# Patient Record
Sex: MALE | Race: WHITE | NOT HISPANIC OR LATINO | Employment: OTHER | URBAN - METROPOLITAN AREA
[De-identification: names, ages, dates, MRNs, and addresses within clinical notes are randomized per-mention and may not be internally consistent; named-entity substitution may affect disease eponyms.]

---

## 2017-01-12 ENCOUNTER — MYC MEDICAL ADVICE (OUTPATIENT)
Dept: FAMILY MEDICINE | Facility: CLINIC | Age: 62
End: 2017-01-12

## 2017-01-12 DIAGNOSIS — R97.20 ELEVATED PROSTATE SPECIFIC ANTIGEN (PSA): Primary | ICD-10-CM

## 2017-01-12 NOTE — TELEPHONE ENCOUNTER
From: Alejo Antunez  To: Americo Brown MD  Sent: 1/12/2017 8:35 AM CST  Subject: PSA test    Dr. Brown,    Three months ago I got PSA results which were 3.6 and a year earlier it was 2.2. You indicated having another PSA test in three months. I have an appointment with you next Wednesday. I have two questions. Is it necessary to have an office visit to get this blood test? Does this blood test require fasting?    Congratulations on your fantasy football championship.    Alejo Antunez

## 2017-01-19 DIAGNOSIS — R97.20 ELEVATED PROSTATE SPECIFIC ANTIGEN (PSA): ICD-10-CM

## 2017-01-19 PROCEDURE — 84153 ASSAY OF PSA TOTAL: CPT | Mod: 90 | Performed by: FAMILY MEDICINE

## 2017-01-19 PROCEDURE — 36415 COLL VENOUS BLD VENIPUNCTURE: CPT | Performed by: FAMILY MEDICINE

## 2017-01-20 LAB — ABBOTT PSA - QUEST: 4.2 NG/ML

## 2017-01-26 ENCOUNTER — TRANSFERRED RECORDS (OUTPATIENT)
Dept: FAMILY MEDICINE | Facility: CLINIC | Age: 62
End: 2017-01-26

## 2017-01-27 ENCOUNTER — MYC MEDICAL ADVICE (OUTPATIENT)
Dept: FAMILY MEDICINE | Facility: CLINIC | Age: 62
End: 2017-01-27

## 2017-01-27 DIAGNOSIS — F40.298 FEAR OF TESTS: Primary | ICD-10-CM

## 2017-01-30 RX ORDER — ALPRAZOLAM 0.5 MG
0.5 TABLET ORAL DAILY PRN
Qty: 2 TABLET | Refills: 0 | Status: SHIPPED | OUTPATIENT
Start: 2017-01-30 | End: 2017-03-29

## 2017-01-30 NOTE — TELEPHONE ENCOUNTER
From: Alejo Antunez  To: Americo Brown MD  Sent: 1/27/2017 1:50 PM CST  Subject: RX    Dr. Brown,    Dr. Nobles is planning to send you an email that I am going to get a prostate biopsy in a few weeks. Is it possible to get a RX for one Xanax pill prior to the biopsy? If not I understand. The Urologist office said that taking one an hour before the biopsy would be fine.    Alejo Antunez

## 2017-02-16 ENCOUNTER — TELEPHONE (OUTPATIENT)
Dept: FAMILY MEDICINE | Facility: CLINIC | Age: 62
End: 2017-02-16

## 2017-02-23 ENCOUNTER — TRANSFERRED RECORDS (OUTPATIENT)
Dept: FAMILY MEDICINE | Facility: CLINIC | Age: 62
End: 2017-02-23

## 2017-03-29 ENCOUNTER — OFFICE VISIT (OUTPATIENT)
Dept: FAMILY MEDICINE | Facility: CLINIC | Age: 62
End: 2017-03-29

## 2017-03-29 VITALS
RESPIRATION RATE: 20 BRPM | TEMPERATURE: 98 F | BODY MASS INDEX: 30.89 KG/M2 | DIASTOLIC BLOOD PRESSURE: 68 MMHG | SYSTOLIC BLOOD PRESSURE: 110 MMHG | WEIGHT: 196.8 LBS | HEART RATE: 64 BPM | HEIGHT: 67 IN

## 2017-03-29 DIAGNOSIS — Z01.818 PRE-OPERATIVE EXAMINATION: Primary | ICD-10-CM

## 2017-03-29 LAB
ERYTHROCYTE [DISTWIDTH] IN BLOOD BY AUTOMATED COUNT: 11.5 %
HCT VFR BLD AUTO: 48.8 % (ref 40–53)
HEMOGLOBIN: 16.2 G/DL (ref 13.3–17.7)
MCH RBC QN AUTO: 31.2 PG (ref 26–33)
MCHC RBC AUTO-ENTMCNC: 33.2 G/DL (ref 31–36)
MCV RBC AUTO: 93.9 FL (ref 78–100)
PLATELET COUNT - QUEST: 254 10^9/L (ref 150–375)
RBC # BLD AUTO: 5.2 10*12/L (ref 4.4–5.9)
WBC # BLD AUTO: 6.7 10*9/L (ref 4–11)

## 2017-03-29 PROCEDURE — 99214 OFFICE O/P EST MOD 30 MIN: CPT | Performed by: PHYSICIAN ASSISTANT

## 2017-03-29 PROCEDURE — 36415 COLL VENOUS BLD VENIPUNCTURE: CPT | Performed by: PHYSICIAN ASSISTANT

## 2017-03-29 PROCEDURE — 85027 COMPLETE CBC AUTOMATED: CPT | Performed by: PHYSICIAN ASSISTANT

## 2017-03-29 NOTE — LETTER
[facility]  [address]  [address]  [facility] [address]  [phone]  Dept: [phone]     PRE-OP EVALUATION:  Today's date: 3/29/2017     Alejo Antunez (: 1955) presents for pre-operative evaluation assessment as requested by Dr. Riggins. He requires evaluation and anesthesia risk assessment prior to undergoing surgery/procedure for treatment of prostate . Proposed procedure: prostate biopsy     Date of Surgery/ Procedure: 17  Time of Surgery/ Procedure: 9am  Hospital/Surgical Facility: [facility]  Fax number for surgical facility:   Primary Physician: Americo Brown  Type of Anesthesia Anticipated: to be determined     Patient has a Health Care Directive or Living Will: YES      1. NO - Do you have a history of heart attack, stroke, stent, bypass or surgery on an artery in the head, neck, heart or legs?  2. NO - Do you ever have any pain or discomfort in your chest?  3. NO - Do you have a history of Heart Failure?  4. NO - Are you troubled by shortness of breath when: walking on the level, up a slight hill or at night?  5. NO - Do you currently have a cold, bronchitis or other respiratory infection?  6. NO - Do you have a cough, shortness of breath or wheezing?  7. NO - Do you sometimes get pains in the calves of your legs when you walk?  8. NO - Do you or anyone in your family have previous history of blood clots?  9. NO - Do you or does anyone in your family have a serious bleeding problem such as prolonged bleeding following surgeries or cuts?  10. NO - Have you ever had problems with anemia or been told to take iron pills?  11. NO - Have you had any abnormal blood loss such as black, tarry or bloody stools, or abnormal vaginal bleeding?  12. NO - Have you ever had a blood transfusion?  13. NO - Have you or any of your relatives ever had problems with anesthesia?  14. NO - Do you have sleep apnea, excessive snoring or daytime  drowsiness?  15. NO - Do you have any prosthetic heart valves?  16. NO - Do you have prosthetic joints?  17. NO - Is there any chance that you may be pregnant?        HPI:       Brief HPI related to upcoming procedure: Alejo first has slightly elevated 10/12/2016 at 3.6. Checked again in 1/2017 and had increased to 4.2. Saw Dr. Riggins, urologist, who given family history of prostate cancer in his father (age 77), recommended biopsy. He gave Alejo the option for local or systemic sedation, and Alejo elected to have systemic sedation. Has had some symptoms with dysuria, decreased stream. Had been on Flomax in past, but no longer taking.     See problem list for active medical problems. Problems all longstanding and stable, except as noted/documented. See ROS for pertinent symptoms related to these conditions. .     MEDICAL HISTORY:               Patient Active Problem List     Diagnosis Date Noted     Other abnormal glucose 10/11/2016       Priority: Medium     Personal history of tobacco use, presenting hazards to health 10/11/2016       Priority: Medium     Psoriasis annularis 10/11/2016       Priority: Medium       Dr Mcgill     ACP (advance care planning) 01/12/2016       Priority: Medium       Advance Care Planning 3/18/2016: Receipt of ACP document: Received: Health Care Directive which was witnessed or notarized on 2-15-16. Document previously scanned on 2-17-16. Validation form completed and sent to be scanned. Code Status needs to be updated to reflect choices in most recent ACP document. Confirmed/documented designated decision maker(s). Added by Maribeth Easley RN Advance Care Planning Liaison with Philip Holliday  Advance Care Planning 1/12/2016: ACP Review of Chart / Resources Provided: Reviewed chart for advance care plan. Alejo Antunez has no plan or code status on file. Discussed available resources and provided with information. Confirmed code status reflects current choices pending further ACP  "discussions. Confirmed/documented legally designated decision maker(s). 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         OTC products:  Took ibuprofen 3 days ago.           Allergies   Allergen Reactions     Sulfa Drugs         Eye drops-redness      Latex Allergy: NO             Social History   Substance Use Topics     Smoking status: Former Smoker       Packs/day: 1.00       Years: 35.00       Types: Cigarettes     Smokeless tobacco: Never Used     Alcohol use 6.0 oz/week        10 Standard drinks or equivalent per week          Comment: 7-8/wk          History   Drug Use No         REVIEW OF SYSTEMS:      Constitutional, neuro, ENT, endocrine, pulmonary, cardiac, gastrointestinal, genitourinary, musculoskeletal, integument and psychiatric systems are negative, except as otherwise noted.     EXAM:      /68 (BP Location: Left arm, Patient Position: Chair, Cuff Size: Adult Regular)  Pulse 64  Temp 98  F (36.7  C) (Oral)  Resp 20  Ht 1.708 m (5' 7.25\")  Wt 89.3 kg (196 lb 12.8 oz)  BMI 30.59 kg/m2  GENERAL APPEARANCE: healthy, alert and no distress  EYES: EOMI, PERRL  HENT: ear canals and TM's normal and nose and mouth " without ulcers or lesions  NECK: no adenopathy, no asymmetry, masses, or scars and thyroid normal to palpation  RESP: lungs clear to auscultation - no rales, rhonchi or wheezes  CV: regular rates and rhythm, normal S1 S2, no S3 or S4 and no murmur, click or rub  ABDOMEN: soft, nontender, no HSM or masses and bowel sounds normal  MS: extremities normal- no gross deformities noted, no evidence of inflammation in joints, FROM in all extremities.  SKIN: no suspicious lesions or rashes  NEURO: Normal strength and tone, sensory exam grossly normal, mentation intact and speech normal  PSYCH: mentation appears normal. and affect normal/bright  LYMPHATICS: No axillary, cervical, or supraclavicular nodes     DIAGNOSTICS:      Hemoglobin (indicated for history of anemia or procedure with significant blood loss such as tonsillectomy, major intraperitoneal surgery, vascular surgery, major spine surgery, total joint replacement)             Office Visit on 03/29/2017   Component Date Value Ref Range Status     WBC 03/29/2017 6.7  4.0 - 11 10*9/L Final     RBC Count 03/29/2017 5.20  4.4 - 5.9 10*12/L Final     Hemoglobin 03/29/2017 16.2  13.3 - 17.7 g/dL Final     Hematocrit 03/29/2017 48.8  40.0 - 53.0 % Final     MCV 03/29/2017 93.9  78 - 100 fL Final     MCH 03/29/2017 31.2  26 - 33 pg Final     MCHC 03/29/2017 33.2  31 - 36 g/dL Final     RDW 03/29/2017 11.5  % Final     Platelet Count 03/29/2017 254  150 - 375 10^9/L Final         IMPRESSION:      Reason for surgery/procedure: Elevated PSA, family history of prostate.   Diagnosis/reason for consult: Pre-operative examination.   The proposed surgical procedure is considered INTERMEDIATE risk.     REVISED CARDIAC RISK INDEX  The patient has the following serious cardiovascular risks for perioperative complications such as (MI, PE, VFib and 3  AV Block):  No serious cardiac risks  INTERPRETATION: 0 risks: Class I (very low risk - 0.4% complication rate)     The patient has the  following additional risks for perioperative complications:  No identified additional risks         ICD-10-CM     1. Pre-operative examination Z01.818 HEMOGRAM/PLATELET (BFP)         RECOMMENDATIONS:         Patient does not take any medications. He will be taking 1 tablet of antibiotic (unsure of which one) on the morning of surgery per urology team.     APPROVAL GIVEN to proceed with proposed procedure, without further diagnostic evaluation      1. Seven days before surgery do not take Aspirin or any over-the-counter pain medications other than Tylenol. TYLENOL is the safest pain pill to use before surgery because it does not affect your bleeding time. If tylenol is not sufficient for pain control talk to me or the surgeon and we will decide what is safe to use.     2. Do not eat anything after midnight (naeem of the surgery) and nothing the morning of the surgery.     3. Medications: Not taking any medications.     4. Follow all instructions given by the surgery team. They usually give out a packet. Read it and please follow it precisely. This helps surgical experience and outcomes.     5. If you have any questions do not hesitate to call me or the surgeon/surgical team.        Jody Saenz PA-C  [facility]           Signed Electronically by: Jody Saenz PA-C     Copy of this evaluation report is provided to requesting physician.

## 2017-03-29 NOTE — NURSING NOTE
Alejo Antunez is here for a pre-op exam.  Questioned patient about current smoking habits.  Pt. recently quit smoking.  Body mass index is 25.89 kg/(m^2).  PULSE regular  My Chart: active  CLASSIFICATION OF OVERWEIGHT AND OBESITY BY BMI                        Obesity Class           BMI(kg/m2)  Underweight                                    < 18.5  Normal                                         18.5-24.9  Overweight                                     25.0-29.9  OBESITY                     I                  30.0-34.9                             II                 35.0-39.9  EXTREME OBESITY             III                >40                            Patient's  BMI Body mass index is 30.59 kg/(m^2).  http://hin.nhlbi.nih.gov/menuplanner/menu.cgi    Pre-visit planning  Immunizations - up to date  Colonoscopy - is up to date  Mammogram -   Asthma -   PHQ9 -    CHAPO-7 -

## 2017-03-29 NOTE — PROGRESS NOTES
[facility]  [address]  [address]  [facility] [address]  [phone]  Dept: [phone]    PRE-OP EVALUATION:  Today's date: 3/29/2017    Alejo Antunez (: 1955) presents for pre-operative evaluation assessment as requested by Dr. Riggins.  He requires evaluation and anesthesia risk assessment prior to undergoing surgery/procedure for treatment of prostate .  Proposed procedure: prostate biopsy    Date of Surgery/ Procedure: 17  Time of Surgery/ Procedure: 9am  Hospital/Surgical Facility: [facility]  Fax number for surgical facility:   Primary Physician: Americo Brown  Type of Anesthesia Anticipated: to be determined    Patient has a Health Care Directive or Living Will:  YES     1. NO - Do you have a history of heart attack, stroke, stent, bypass or surgery on an artery in the head, neck, heart or legs?  2. NO - Do you ever have any pain or discomfort in your chest?  3. NO - Do you have a history of  Heart Failure?  4. NO - Are you troubled by shortness of breath when: walking on the level, up a slight hill or at night?  5. NO - Do you currently have a cold, bronchitis or other respiratory infection?  6. NO - Do you have a cough, shortness of breath or wheezing?  7. NO - Do you sometimes get pains in the calves of your legs when you walk?  8. NO - Do you or anyone in your family have previous history of blood clots?  9. NO - Do you or does anyone in your family have a serious bleeding problem such as prolonged bleeding following surgeries or cuts?  10. NO - Have you ever had problems with anemia or been told to take iron pills?  11. NO - Have you had any abnormal blood loss such as black, tarry or bloody stools, or abnormal vaginal bleeding?  12. NO - Have you ever had a blood transfusion?  13. NO - Have you or any of your relatives ever had problems with anesthesia?  14. NO - Do you have sleep apnea, excessive snoring or daytime  drowsiness?  15. NO - Do you have any prosthetic heart valves?  16. NO - Do you have prosthetic joints?  17. NO - Is there any chance that you may be pregnant?      HPI:                                                      Brief HPI related to upcoming procedure: Alejo first has slightly elevated 10/12/2016 at 3.6. Checked again in 1/2017 and had increased to 4.2. Saw Dr. Riggins, urologist, who given family history of prostate cancer in his father (age 77), recommended biopsy. He gave Alejo the option for local or systemic sedation, and Alejo elected to have systemic sedation. Has had some symptoms with dysuria, decreased stream. Had been on Flomax in past, but no longer taking.    See problem list for active medical problems.  Problems all longstanding and stable, except as noted/documented.  See ROS for pertinent symptoms related to these conditions.                                                                                                 .    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  Document previously scanned on 2-17-16.  Validation form completed and sent to be scanned.  Code Status needs to be updated to reflect choices in most recent ACP document. Confirmed/documented designated decision maker(s).  Added by Maribeth Easley RN Advance Care Planning Liaison with Philip Holliday  Advance Care Planning 1/12/2016: ACP Review of Chart / Resources Provided:  Reviewed chart for advance care plan.  Alejo DAVEY  "Tulio has no plan or code status on file. Discussed available resources and provided with information. Confirmed code status reflects current choices pending further ACP discussions.  Confirmed/documented legally designated decision maker(s). 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     OTC products:  Took ibuprofen 3 days ago.    Allergies   Allergen Reactions     Sulfa Drugs      Eye drops-redness      Latex Allergy: NO    Social History   Substance Use Topics     Smoking status: Former Smoker     Packs/day: 1.00     Years: 35.00     Types: Cigarettes     Smokeless tobacco: Never Used     Alcohol use 6.0 oz/week     10 Standard drinks or equivalent per week      Comment: 7-8/wk     History   Drug Use No       REVIEW OF SYSTEMS:                                                    Constitutional, neuro, ENT, endocrine, pulmonary, cardiac, gastrointestinal, genitourinary, musculoskeletal, integument and psychiatric systems are negative, except as otherwise noted.    EXAM:                                                    /68 (BP Location: Left arm, Patient Position: Chair, Cuff Size: Adult Regular)  Pulse 64  Temp 98  F (36.7  C) (Oral)  Resp 20  Ht 1.708 m (5' 7.25\")  Wt 89.3 kg (196 lb 12.8 oz)  BMI 30.59 kg/m2    GENERAL APPEARANCE: " healthy, alert and no distress     EYES: EOMI,  PERRL     HENT: ear canals and TM's normal and nose and mouth without ulcers or lesions     NECK: no adenopathy, no asymmetry, masses, or scars and thyroid normal to palpation     RESP: lungs clear to auscultation - no rales, rhonchi or wheezes     CV: regular rates and rhythm, normal S1 S2, no S3 or S4 and no murmur, click or rub     ABDOMEN:  soft, nontender, no HSM or masses and bowel sounds normal     MS: extremities normal- no gross deformities noted, no evidence of inflammation in joints, FROM in all extremities.     SKIN: no suspicious lesions or rashes     NEURO: Normal strength and tone, sensory exam grossly normal, mentation intact and speech normal     PSYCH: mentation appears normal. and affect normal/bright     LYMPHATICS: No axillary, cervical, or supraclavicular nodes    DIAGNOSTICS:                                                    Hemoglobin (indicated for history of anemia or procedure with significant blood loss such as tonsillectomy, major intraperitoneal surgery, vascular surgery, major spine surgery, total joint replacement)    Office Visit on 03/29/2017   Component Date Value Ref Range Status     WBC 03/29/2017 6.7  4.0 - 11 10*9/L Final     RBC Count 03/29/2017 5.20  4.4 - 5.9 10*12/L Final     Hemoglobin 03/29/2017 16.2  13.3 - 17.7 g/dL Final     Hematocrit 03/29/2017 48.8  40.0 - 53.0 % Final     MCV 03/29/2017 93.9  78 - 100 fL Final     MCH 03/29/2017 31.2  26 - 33 pg Final     MCHC 03/29/2017 33.2  31 - 36 g/dL Final     RDW 03/29/2017 11.5  % Final     Platelet Count 03/29/2017 254  150 - 375 10^9/L Final       IMPRESSION:                                                    Reason for surgery/procedure: Elevated PSA, family history of prostate.   Diagnosis/reason for consult: Pre-operative examination.    The proposed surgical procedure is considered INTERMEDIATE risk.    REVISED CARDIAC RISK INDEX  The patient has the following serious  cardiovascular risks for perioperative complications such as (MI, PE, VFib and 3  AV Block):  No serious cardiac risks  INTERPRETATION: 0 risks: Class I (very low risk - 0.4% complication rate)    The patient has the following additional risks for perioperative complications:  No identified additional risks      ICD-10-CM    1. Pre-operative examination Z01.818 HEMOGRAM/PLATELET (BFP)       RECOMMENDATIONS:                                                      Patient does not take any medications. He will be taking 1 tablet of antibiotic (unsure of which one) on the morning of surgery per urology team.    APPROVAL GIVEN to proceed with proposed procedure, without further diagnostic evaluation     1. Seven days before surgery do not take Aspirin or any over-the-counter pain medications other than Tylenol.  TYLENOL is the safest pain pill to use before surgery because it does not affect your bleeding time. If tylenol is not sufficient for pain control talk to me or the surgeon and we will decide what is safe to use.    2. Do not eat anything after midnight  (naeem of the surgery) and nothing the morning of the surgery.    3. Medications: Not taking any medications.    4. Follow all instructions given by the surgery team. They usually give out a packet. Read it and please follow it precisely. This helps surgical experience and outcomes.    5. If you have any questions do not hesitate to call me or the surgeon/surgical team.      Jody Saenz PA-C  [facility]        Signed Electronically by: Jody Saenz PA-C    Copy of this evaluation report is provided to requesting physician.    [facility] Preop Guidelines

## 2017-03-29 NOTE — MR AVS SNAPSHOT
"              After Visit Summary   3/29/2017    Alejo Antunez    MRN: [id number]           Patient Information     Date Of Birth          1955        Visit Information        Provider Department      3/29/2017 9:00 AM Jody Saenz PA-C [facility]        Today's Diagnoses     Pre-operative examination    -  1       Follow-ups after your visit        Who to contact     If you have questions or need follow up information about today's clinic visit or your schedule please contact [facility] directly at [phone].  Normal or non-critical lab and imaging results will be communicated to you by [employer], letter or phone within 4 business days after the clinic has received the results. If you do not hear from us within 7 days, please contact the clinic through [employer] or phone. If you have a critical or abnormal lab result, we will notify you by phone as soon as possible.  Submit refill requests through [employer] or call your pharmacy and they will forward the refill request to us. Please allow 3 business days for your refill to be completed.          Additional Information About Your Visit        MyChart Information     [employer] gives you secure access to your electronic health record. If you see a primary care provider, you can also send messages to your care team and make appointments. If you have questions, please call your primary care clinic.  If you do not have a primary care provider, please call [phone] and they will assist you.        Care EveryWhere ID     This is your Care EveryWhere ID. This could be used by other organizations to access your [facility] medical records  [id number]        Your Vitals Were     Pulse Temperature Respirations Height BMI (Body Mass Index)       64 98  F (36.7  C) (Oral) 20 1.708 m (5' 7.25\") 30.59 kg/m2        Blood Pressure from Last 3 Encounters:   03/29/17 110/68   10/11/16 122/76   08/25/16 110/70    Weight from " Last 3 Encounters:   03/29/17 89.3 kg (196 lb 12.8 oz)   10/11/16 84 kg (185 lb 3.2 oz)   08/25/16 83.9 kg (185 lb)              We Performed the Following     HEMOGRAM/PLATELET (BFP)        Primary Care Provider Office Phone # Fax #    Americo David Brown [phone]       [facility] [address] [facility] [address]  [facility] [address]        Thank you!     Thank you for choosing [facility]  for your care. Our goal is always to provide you with excellent care. Hearing back from our patients is one way we can continue to improve our services. Please take a few minutes to complete the written survey that you may receive in the mail after your visit with us. Thank you!             Your Updated Medication List - Protect others around you: Learn how to safely use, store and throw away your medicines at www.disposemymeds.org.      Notice  As of 3/29/2017  9:34 AM    You have not been prescribed any medications.

## 2017-05-31 ENCOUNTER — OFFICE VISIT (OUTPATIENT)
Dept: FAMILY MEDICINE | Facility: CLINIC | Age: 62
End: 2017-05-31

## 2017-05-31 VITALS
SYSTOLIC BLOOD PRESSURE: 108 MMHG | HEART RATE: 88 BPM | RESPIRATION RATE: 20 BRPM | TEMPERATURE: 98.7 F | WEIGHT: 195.6 LBS | BODY MASS INDEX: 30.7 KG/M2 | DIASTOLIC BLOOD PRESSURE: 68 MMHG | HEIGHT: 67 IN
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
## 2017-05-31 PROCEDURE — 99213 OFFICE O/P EST LOW 20 MIN: CPT | Performed by: PHYSICIAN ASSISTANT

## 2017-05-31 RX ORDER — DOXYCYCLINE HYCLATE 100 MG
100 TABLET ORAL 2 TIMES DAILY
Qty: 28 TABLET | Refills: 0 | Status: SHIPPED | OUTPATIENT
Start: 2017-05-31 | End: 2017-11-16

## 2017-05-31 RX ORDER — DOXYCYCLINE HYCLATE 100 MG
100 TABLET ORAL 2 TIMES DAILY
Qty: 28 TABLET | Refills: 0 | Status: SHIPPED | OUTPATIENT
Start: 2017-05-31 | End: 2017-05-31

## 2017-05-31 NOTE — NURSING NOTE
Alejo Antunez is here for a possible infected tick bite.    Questioned patient about current smoking habits.  Pt. quit smoking some time ago.  Body mass index is 25.89 kg/(m^2).  PULSE regular  My Chart: active  CLASSIFICATION OF OVERWEIGHT AND OBESITY BY BMI                        Obesity Class           BMI(kg/m2)  Underweight                                    < 18.5  Normal                                         18.5-24.9  Overweight                                     25.0-29.9  OBESITY                     I                  30.0-34.9                             II                 35.0-39.9  EXTREME OBESITY             III                >40                            Patient's  BMI Body mass index is 30.64 kg/(m^2).  http://hin.nhlbi.nih.gov/menuplanner/menu.cgi    Pre-visit planning  Immunizations - up to date  Colonoscopy - is up to date  Mammogram -   Asthma -   PHQ9 -    CHAPO-7 -

## 2017-05-31 NOTE — MR AVS SNAPSHOT
"              After Visit Summary   5/31/2017    Alejo Antunez    MRN: [id number]           Patient Information     Date Of Birth          1955        Visit Information        Provider Department      5/31/2017 1:30 PM Jody Saenz PA-C [facility]        Today's Diagnoses     Tick bite, initial encounter    -  1       Follow-ups after your visit        Follow-up notes from your care team     Return if symptoms worsen or fail to improve.      Who to contact     If you have questions or need follow up information about today's clinic visit or your schedule please contact [facility] directly at [phone].  Normal or non-critical lab and imaging results will be communicated to you by Process Relationshart, letter or phone within 4 business days after the clinic has received the results. If you do not hear from us within 7 days, please contact the clinic through Process Relationshart or phone. If you have a critical or abnormal lab result, we will notify you by phone as soon as possible.  Submit refill requests through YODIL or call your pharmacy and they will forward the refill request to us. Please allow 3 business days for your refill to be completed.          Additional Information About Your Visit        MyChart Information     YODIL gives you secure access to your electronic health record. If you see a primary care provider, you can also send messages to your care team and make appointments. If you have questions, please call your primary care clinic.  If you do not have a primary care provider, please call [phone] and they will assist you.        Care EveryWhere ID     This is your Care EveryWhere ID. This could be used by other organizations to access your [place] medical records  [id number]        Your Vitals Were     Pulse Temperature Respirations Height BMI (Body Mass Index)       88 98.7  F (37.1  C) (Oral) 20 1.702 m (5' 7\") 30.64 kg/m2        Blood Pressure " from Last 3 Encounters:   05/31/17 108/68   03/29/17 110/68   10/11/16 122/76    Weight from Last 3 Encounters:   05/31/17 88.7 kg (195 lb 9.6 oz)   03/29/17 89.3 kg (196 lb 12.8 oz)   10/11/16 84 kg (185 lb 3.2 oz)              Today, you had the following     No orders found for display         Today's Medication Changes          These changes are accurate as of: 5/31/17  4:18 PM.  If you have any questions, ask your nurse or doctor.               Start taking these medicines.        Dose/Directions    doxycycline 100 MG tablet   Commonly known as:  VIBRA-TABS   Used for:  Tick bite, initial encounter   Started by:  Jody Saenz PA-C        Dose:  100 mg   Take 1 tablet (100 mg) by mouth 2 times daily   Quantity:  28 tablet   Refills:  0            Where to get your medicines      These medications were sent to [facility] [address] [place] [address] AT [place] [address] & [facility]  [address] [address] [facility] [address]     Phone:  [phone]     doxycycline 100 MG tablet                Primary Care Provider Office Phone # Fax #    Americo Brown [phone]       [facility] [address] [facility] [address]  [facility] [address]        Thank you!     Thank you for choosing [facility]  for your care. Our goal is always to provide you with excellent care. Hearing back from our patients is one way we can continue to improve our services. Please take a few minutes to complete the written survey that you may receive in the mail after your visit with us. Thank you!             Your Updated Medication List - Protect others around you: Learn how to safely use, store and throw away your medicines at www.disposemymeds.org.          This list is accurate as of: 5/31/17  4:18 PM.  Always use your most recent med list.                   Brand Name Dispense Instructions for use    doxycycline 100 MG tablet    VIBRA-TABS     28 tablet    Take 1 tablet (100 mg) by mouth 2 times daily

## 2017-05-31 NOTE — PROGRESS NOTES
"CC: Tick bite    History:  Alejo is here concerned about a reaction to a tick bite. He identified it as a wood tick. He found the tick on Monday night, and does not think it was there for any more than 24 hours. Has not noticed any drainage from the site, but there is some redness, swelling and itching. No fevers, sweats, chills, muscle/joint aches, neck pain or stiffness. Tried applying steroid cream that he has for his eczema which helped temporarily. Has not taken any allergy medication.    Alejo [facility] when he got this tick, and plans to go back up there tomorrow or Friday.     PMH, MEDICATIONS, ALLERGIES, SOCIAL AND FAMILY HISTORY in [facility] and reviewed by me personally.      ROS negative other than the symptoms noted above in the HPI.        Examination   /68 (BP Location: Right arm, Patient Position: Chair, Cuff Size: Adult Regular)  Pulse 88  Temp 98.7  F (37.1  C) (Oral)  Resp 20  Ht 1.702 m (5' 7\")  Wt 88.7 kg (195 lb 9.6 oz)  BMI 30.64 kg/m2       Constitutional: Sitting comfortably, in no acute distress. Vital signs noted  Cardiovascular:  regular rate and rhythm, no murmurs, clicks, or gallops  Respiratory:  normal respiratory rate and rhythm, lungs clear to auscultation  Abdomen: Abdomen soft, non-tender.  SKIN: No jaundice/pallor/rash.   Psychiatric: mentation appears normal and affect normal/bright        A/P    ICD-10-CM    1. Tick bite, initial encounter W57.XXXA doxycycline (VIBRA-TABS) 100 MG tablet     DISCONTINUED: doxycycline (VIBRA-TABS) 100 MG tablet    possible infection, unlikely [place]       DISCUSSION: I believe Alejo's symptoms are most consistent with an allergic reaction to the tick bite. Recommended:  Apply the steroid cream (likely triamcinolone) 2-3 times/day for 1-2 weeks. Watch for skin changes, and discontinue if noticed  Consider taking Allegra, Zyrtec, or Claritin 1 24 hour tablet daily to help with itching. Can also take Benedryl at night- it " will likely make you drowsy.   Watch for signs of spreading redness, pus drainage, bulls-eye shape over the next 2-3 weeks. If you notice this symptoms, start doxycycline with food, and contact us immediately.    follow up visit: As needed    Jody Saenz PA-C  [facility]

## 2017-09-19 DIAGNOSIS — R97.20 ELEVATED PROSTATE SPECIFIC ANTIGEN (PSA): Primary | ICD-10-CM

## 2017-09-19 PROCEDURE — 84153 ASSAY OF PSA TOTAL: CPT | Mod: 90 | Performed by: FAMILY MEDICINE

## 2017-09-19 PROCEDURE — 36415 COLL VENOUS BLD VENIPUNCTURE: CPT | Performed by: FAMILY MEDICINE

## 2017-09-19 NOTE — NURSING NOTE
Pt here for lab only blood draw, has orders from [facility] urology.  Will fax labs when resulted.   [phone]

## 2017-09-20 LAB — ABBOTT PSA - QUEST: 4.5 NG/ML

## 2017-10-09 ENCOUNTER — TRANSFERRED RECORDS (OUTPATIENT)
Dept: FAMILY MEDICINE | Facility: CLINIC | Age: 62
End: 2017-10-09

## 2017-10-19 ENCOUNTER — ALLIED HEALTH/NURSE VISIT (OUTPATIENT)
Dept: FAMILY MEDICINE | Facility: CLINIC | Age: 62
End: 2017-10-19

## 2017-10-19 DIAGNOSIS — Z23 NEED FOR VACCINATION: Primary | ICD-10-CM

## 2017-10-19 PROCEDURE — 90686 IIV4 VACC NO PRSV 0.5 ML IM: CPT | Performed by: FAMILY MEDICINE

## 2017-10-19 PROCEDURE — 90471 IMMUNIZATION ADMIN: CPT | Performed by: FAMILY MEDICINE

## 2017-11-16 ENCOUNTER — OFFICE VISIT (OUTPATIENT)
Dept: FAMILY MEDICINE | Facility: CLINIC | Age: 62
End: 2017-11-16

## 2017-11-16 VITALS
WEIGHT: 187.4 LBS | DIASTOLIC BLOOD PRESSURE: 78 MMHG | SYSTOLIC BLOOD PRESSURE: 112 MMHG | BODY MASS INDEX: 29.41 KG/M2 | TEMPERATURE: 97.8 F | HEIGHT: 67 IN | HEART RATE: 75 BPM | OXYGEN SATURATION: 98 %

## 2017-11-16 DIAGNOSIS — Z80.42 FAMILY HISTORY OF PROSTATE CANCER: ICD-10-CM

## 2017-11-16 DIAGNOSIS — Z00.00 ROUTINE GENERAL MEDICAL EXAMINATION AT A HEALTH CARE FACILITY: Primary | ICD-10-CM

## 2017-11-16 DIAGNOSIS — F40.243 FEAR OF FLYING: ICD-10-CM

## 2017-11-16 PROCEDURE — 80053 COMPREHEN METABOLIC PANEL: CPT | Mod: 90 | Performed by: FAMILY MEDICINE

## 2017-11-16 PROCEDURE — 36415 COLL VENOUS BLD VENIPUNCTURE: CPT | Performed by: FAMILY MEDICINE

## 2017-11-16 PROCEDURE — 80061 LIPID PANEL: CPT | Mod: 90 | Performed by: FAMILY MEDICINE

## 2017-11-16 PROCEDURE — 99396 PREV VISIT EST AGE 40-64: CPT | Performed by: FAMILY MEDICINE

## 2017-11-16 RX ORDER — ALPRAZOLAM 0.5 MG
0.5 TABLET ORAL 3 TIMES DAILY PRN
Qty: 10 TABLET | Refills: 0 | Status: SHIPPED | OUTPATIENT
Start: 2017-11-16 | End: 2019-08-12

## 2017-11-16 RX ORDER — TACROLIMUS 1 MG/G
OINTMENT TOPICAL 2 TIMES DAILY
COMMUNITY
End: 2019-10-22

## 2017-11-16 RX ORDER — FINASTERIDE 5 MG/1
5 TABLET, FILM COATED ORAL DAILY
COMMUNITY
End: 2019-10-22

## 2017-11-16 NOTE — PROGRESS NOTES
SUBJECTIVE:   CC: Alejo Antunez is an 62 year old male who presents for preventative health visit.     Healthy Habits:    Do you get at least three servings of calcium containing foods daily (dairy, green leafy vegetables, etc.)? yes    Amount of exercise or daily activities, outside of work: 5 day(s) per week    Problems taking medications regularly No    Medication side effects: No    Have you had an eye exam in the past two years? yes    Do you see a dentist twice per year? yes    Do you have sleep apnea, excessive snoring or daytime drowsiness?no              Today's PHQ-2 Score: PHQ-2 ( 1999 Pfizer) 11/16/2017 10/11/2016   Q1: Little interest or pleasure in doing things 0 0   Q2: Feeling down, depressed or hopeless 0 0   PHQ-2 Score 0 0   Q1: Little interest or pleasure in doing things - -   Q2: Feeling down, depressed or hopeless - -   PHQ-2 Score - -         Abuse: Current or Past(Physical, Sexual or Emotional)- No  Do you feel safe in your environment - Yes  Social History   Substance Use Topics     Smoking status: Current Every Day Smoker     Packs/day: 1.00     Years: 35.00     Types: Cigarettes     Smokeless tobacco: Never Used     Alcohol use 6.0 oz/week     10 Standard drinks or equivalent per week      Comment: 7-8/wk     The patient does not drink >3 drinks per day nor >7 drinks per week.    Last PSA:   Abbott PSA   Date Value Ref Range Status   09/19/2017 4.5 (H) < OR = 4.0 ng/mL Final     Comment:     The total PSA value from this assay system is   standardized against the WHO standard. The test   result will be approximately 20% lower when compared   to the equimolar-standardized total PSA (Clarence   Flako). Comparison of serial PSA results should be   interpreted with this fact in mind.     This test was performed using the Siemens   chemiluminescent method. Values obtained from   different assay methods cannot be used  interchangeably. PSA levels, regardless of  value, should not be  interpreted as absolute  evidence of the presence or absence of disease.         Reviewed orders with patient. Reviewed health maintenance and updated orders accordingly - Yes  BP Readings from Last 3 Encounters:   11/16/17 112/78   05/31/17 108/68   03/29/17 110/68    Wt Readings from Last 3 Encounters:   11/16/17 85 kg (187 lb 6.4 oz)   05/31/17 88.7 kg (195 lb 9.6 oz)   03/29/17 89.3 kg (196 lb 12.8 oz)                  Patient Active Problem List   Diagnosis     CARDIOVASCULAR SCREENING; LDL GOAL LESS THAN 160     Traumatic cataract of eye     Tobacco use disorder     Family history of prostate cancer     Health Care Home     Fear of flying     ACP (advance care planning)     Other abnormal glucose     Personal history of tobacco use, presenting hazards to health     Psoriasis annularis     Past Surgical History:   Procedure Laterality Date     CATARACT IOL, RT/LT Left      cornea surgery Left      ROTATOR CUFF REPAIR RT/LT Right 2005     TONSILLECTOMY & ADENOIDECTOMY         Social History   Substance Use Topics     Smoking status: Current Every Day Smoker     Packs/day: 1.00     Years: 35.00     Types: Cigarettes     Smokeless tobacco: Never Used     Alcohol use 6.0 oz/week     10 Standard drinks or equivalent per week      Comment: 7-8/wk     Family History   Problem Relation Age of Onset     Eye Disorder Mother      glaucoma     Prostate Cancer Father          Current Outpatient Prescriptions   Medication Sig Dispense Refill     finasteride (PROSCAR) 5 MG tablet Take 5 mg by mouth daily       tacrolimus (PROTOPIC) 0.1 % ointment Apply topically 2 times daily       ALPRAZolam (XANAX) 0.5 MG tablet Take 1 tablet (0.5 mg) by mouth 3 times daily as needed for anxiety 10 tablet 0     Allergies   Allergen Reactions     Sulfa Drugs      Eye drops-redness     Recent Labs   Lab Test  10/11/16   1025  01/19/15   1434  01/23/13   0833   LDL  78  99  104   HDL  84  66  62   TRIG  58  56  83   ALT  11   --    --    CR   "1.04  0.89  1.08   GFRESTIMATED  76  87  70   GFRESTBLACK   --   >90   GFR Calc    85   POTASSIUM  4.0  4.0  4.5              Reviewed and updated as needed this visit by clinical staffTobacco  Allergies  Meds  Problems  Soc Hx        Reviewed and updated as needed this visit by Provider        Past Medical History:   Diagnosis Date     NO ACTIVE PROBLEMS       Past Surgical History:   Procedure Laterality Date     CATARACT IOL, RT/LT Left      cornea surgery Left      ROTATOR CUFF REPAIR RT/LT Right 2005     TONSILLECTOMY & ADENOIDECTOMY         ROS:  C: NEGATIVE for fever, chills, change in weight  I: NEGATIVE for worrisome rashes, moles or lesions  E: NEGATIVE for vision changes or irritation  ENT: NEGATIVE for ear, mouth and throat problems  R: NEGATIVE for significant cough or SOB  CV: NEGATIVE for chest pain, palpitations or peripheral edema  GI: NEGATIVE for nausea, abdominal pain, heartburn, or change in bowel habits   male: negative for dysuria, hematuria, decreased urinary stream, erectile dysfunction, urethral discharge  M: NEGATIVE for significant arthralgias or myalgia  N: NEGATIVE for weakness, dizziness or paresthesias  P: NEGATIVE for changes in mood or affect    OBJECTIVE:   /78 (BP Location: Left arm, Patient Position: Chair, Cuff Size: Adult Large)  Pulse 75  Temp 97.8  F (36.6  C) (Oral)  Ht 1.702 m (5' 7\")  Wt 85 kg (187 lb 6.4 oz)  SpO2 98%  BMI 29.35 kg/m2  EXAM:  GENERAL: healthy, alert and no distress  EYES: Eyes grossly normal to inspection, PERRL and conjunctivae and sclerae normal  HENT: ear canals and TM's normal, nose and mouth without ulcers or lesions  NECK: no adenopathy, no asymmetry, masses, or scars and thyroid normal to palpation  RESP: lungs clear to auscultation - no rales, rhonchi or wheezes  CV: regular rate and rhythm, normal S1 S2, no S3 or S4, no murmur, click or rub, no peripheral edema and peripheral pulses strong  ABDOMEN: soft, " "nontender, no hepatosplenomegaly, no masses and bowel sounds normal   (male): normal male genitalia without lesions or urethral discharge, no hernia  MS: no gross musculoskeletal defects noted, no edema  SKIN: no suspicious lesions or rashes  NEURO: Normal strength and tone, mentation intact and speech normal  PSYCH: mentation appears normal, affect normal/bright    ASSESSMENT/PLAN:   (Z00.00) Routine general medical examination at a health care facility  (primary encounter diagnosis)  Comment: discussed preventitive healthcare   Plan: Lipid Profile ([facility]), COMPREHENSIVE METABOLIC         PANEL ([facility]) XCMP, VENOUS COLLECTION        Continue to work on healthy diet and exercise, discussed healthy habits     (F40.243) Fear of flying  Comment:   Plan: ALPRAZolam (XANAX) 0.5 MG tablet, VENOUS         COLLECTION            (Z80.42) Family history of prostate cancer  Comment: followed by urology-had normal MRI recently  Plan: continue to follow with urology    COUNSELING:  Reviewed preventive health counseling, as reflected in patient instructions       Regular exercise       Healthy diet/nutrition       Colon cancer screening       Prostate cancer screening           reports that he has been smoking Cigarettes.  He has a 35.00 pack-year smoking history. He has never used smokeless tobacco.  Tobacco Cessation Action Plan: Information offered: Patient not interested at this time  Estimated body mass index is 29.35 kg/(m^2) as calculated from the following:    Height as of this encounter: 1.702 m (5' 7\").    Weight as of this encounter: 85 kg (187 lb 6.4 oz).   Weight management plan: Discussed healthy diet and exercise guidelines and patient will follow up in 12 months in clinic to re-evaluate.    Counseling Resources:  ATP IV Guidelines  Pooled Cohorts Equation Calculator  FRAX Risk Assessment  ICSI Preventive Guidelines  Dietary Guidelines for Americans, 2010  USDA's MyPlate  ASA Prophylaxis  Lung CA " Screening    Americo Brown MD  [facility]

## 2017-11-16 NOTE — NURSING NOTE
"Alejo Antunez is here for a CPX. Fasting: Yes (12+ hours).    Pre-visit Planning  Immunizations -up to date  Colonoscopy -is up to date   Mammogram -NA   Asthma test --NA  PHQ2 -is completed today (Score: 0)  CHAPO 7 -NA  Fall Risk Assessment -NA    Vitals:  BP Cuff left  Arm with large Cuff  PULSE regular  187 lbs 6.4 oz and 5' 7\"  CLASSIFICATION OF OVERWEIGHT AND OBESITY BY BMI                        Obesity Class           BMI(kg/m2)  Underweight                                    < 18.5  Normal                                         18.5-24.9  Overweight                                     25.0-29.9  OBESITY                     I                  30.0-34.9                             II                 35.0-39.9  EXTREME OBESITY             III                >40      Patient's  BMI Body mass index is 29.35 kg/(m^2).  Http://hin.nhlbi.nih.gov/menuplanner/menu.cgi  My Chart: - accepts   Tobacco Use:  Questioned patient about current smoking habits.  Pt. Currently smokes.  ETOH screening Questions:  1-How often do you have a drink containing alcohol?                             2 times per week(s)  2-How many drinks containing alcohol do you have on a typical day when you are         Drinking?                             3   3- How often do you have 5 or more drinks on one occasion?                             2 months    Have you ever:  None of the patient's responses to the CAGE screening were positive / Negative CAGE score    Roomed by: Domitila Aguilera CMA      "

## 2017-11-17 LAB
ALBUMIN SERPL-MCNC: 4.5 G/DL (ref 3.6–5.1)
ALBUMIN/GLOB SERPL: 2 (CALC) (ref 1–2.5)
ALP SERPL-CCNC: 57 U/L (ref 40–115)
ALT SERPL-CCNC: 16 U/L (ref 9–46)
AST SERPL-CCNC: 13 U/L (ref 10–35)
BILIRUB SERPL-MCNC: 0.8 MG/DL (ref 0.2–1.2)
BUN SERPL-MCNC: 15 MG/DL (ref 7–25)
BUN/CREATININE RATIO: ABNORMAL (CALC) (ref 6–22)
CALCIUM SERPL-MCNC: 9.4 MG/DL (ref 8.6–10.3)
CHLORIDE SERPLBLD-SCNC: 105 MMOL/L (ref 98–110)
CHOLEST SERPL-MCNC: 199 MG/DL
CHOLEST/HDLC SERPL: 2.5 (CALC)
CO2 SERPL-SCNC: 24 MMOL/L (ref 20–31)
CREAT SERPL-MCNC: 0.99 MG/DL (ref 0.7–1.25)
EGFR AFRICAN AMERICAN - QUEST: 93 ML/MIN/1.73M2
GFR SERPL CREATININE-BSD FRML MDRD: 80 ML/MIN/1.73M2
GLOBULIN, CALCULATED - QUEST: 2.2 G/DL (CALC) (ref 1.9–3.7)
GLUCOSE - QUEST: 109 MG/DL (ref 65–99)
HDLC SERPL-MCNC: 80 MG/DL
LDLC SERPL CALC-MCNC: 104 MG/DL (CALC)
NONHDLC SERPL-MCNC: 119 MG/DL (CALC)
POTASSIUM SERPL-SCNC: 5 MMOL/L (ref 3.5–5.3)
PROT SERPL-MCNC: 6.7 G/DL (ref 6.1–8.1)
SODIUM SERPL-SCNC: 138 MMOL/L (ref 135–146)
TRIGL SERPL-MCNC: 60 MG/DL

## 2017-12-06 ENCOUNTER — OFFICE VISIT (OUTPATIENT)
Dept: FAMILY MEDICINE | Facility: CLINIC | Age: 62
End: 2017-12-06

## 2017-12-06 VITALS
DIASTOLIC BLOOD PRESSURE: 66 MMHG | TEMPERATURE: 98.3 F | RESPIRATION RATE: 20 BRPM | HEART RATE: 68 BPM | SYSTOLIC BLOOD PRESSURE: 102 MMHG

## 2017-12-06 DIAGNOSIS — R20.9 SKIN SENSATION DISTURBANCE: ICD-10-CM
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
## 2017-12-06 DIAGNOSIS — S69.92XA HAND INJURY, LEFT, INITIAL ENCOUNTER: ICD-10-CM

## 2017-12-06 PROCEDURE — 99213 OFFICE O/P EST LOW 20 MIN: CPT | Performed by: PHYSICIAN ASSISTANT

## 2017-12-06 PROCEDURE — 73130 X-RAY EXAM OF HAND: CPT | Mod: LT | Performed by: PHYSICIAN ASSISTANT

## 2017-12-06 RX ORDER — IBUPROFEN 800 MG/1
800 TABLET, FILM COATED ORAL EVERY 8 HOURS PRN
Qty: 60 TABLET | Refills: 0 | Status: SHIPPED | OUTPATIENT
Start: 2017-12-06 | End: 2019-10-22
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
## 2017-12-27 ENCOUNTER — TELEPHONE (OUTPATIENT)
Dept: FAMILY MEDICINE | Facility: CLINIC | Age: 62
End: 2017-12-27

## 2018-01-08 ENCOUNTER — OFFICE VISIT (OUTPATIENT)
Dept: FAMILY MEDICINE | Facility: CLINIC | Age: 63
End: 2018-01-08

## 2018-01-08 VITALS
HEIGHT: 68 IN | DIASTOLIC BLOOD PRESSURE: 78 MMHG | BODY MASS INDEX: 28.49 KG/M2 | WEIGHT: 188 LBS | TEMPERATURE: 99 F | OXYGEN SATURATION: 97 % | HEART RATE: 79 BPM | SYSTOLIC BLOOD PRESSURE: 114 MMHG

## 2018-01-08 DIAGNOSIS — J01.00 ACUTE NON-RECURRENT MAXILLARY SINUSITIS: Primary | ICD-10-CM

## 2018-01-08 PROCEDURE — 99213 OFFICE O/P EST LOW 20 MIN: CPT | Performed by: PHYSICIAN ASSISTANT

## 2018-01-08 RX ORDER — CODEINE PHOSPHATE AND GUAIFENESIN 10; 100 MG/5ML; MG/5ML
1-2 SOLUTION ORAL EVERY 4 HOURS PRN
Qty: 236 ML | Refills: 0 | Status: SHIPPED | OUTPATIENT
Start: 2018-01-08 | End: 2018-06-12

## 2018-01-08 RX ORDER — CODEINE PHOSPHATE AND GUAIFENESIN 10; 100 MG/5ML; MG/5ML
1-2 SOLUTION ORAL EVERY 4 HOURS PRN
Qty: 236 ML | Refills: 0 | Status: SHIPPED | OUTPATIENT
Start: 2018-01-08 | End: 2018-01-08

## 2018-01-08 NOTE — PROGRESS NOTES
"CC: Cough,     History:  For the past 11 days, Alejo has been struggling with cough that seems superficial/spasmodic. Has even coughed so much where he was nervous he may vomit. Tends to come in attacks, and tends to be when he is laying down or reclining. Has had a little bit of laryngitis off and on throughout. No fever, sweats, chills. Took ibuprofen 1 hour ago for a headache. Headache is in entire head, and the ibuprofen did lead to relief. No facial pressure/pain, but is having some post nasal drip.     Has been trying OTC cough suppressants and lozenges, but they don't seem to cover it.     PMH, MEDICATIONS, ALLERGIES, SOCIAL AND FAMILY HISTORY in [facility] and reviewed by me personally.      ROS negative other than the symptoms noted above in the HPI.        Examination   /78 (BP Location: Left arm, Patient Position: Chair, Cuff Size: Adult Large)  Pulse 79  Temp 99  F (37.2  C) (Oral)  Ht 1.715 m (5' 7.5\")  Wt 85.3 kg (188 lb)  SpO2 97%  BMI 29.01 kg/m2       Constitutional: Sitting comfortably, in no acute distress. Vital signs noted  Eyes: pupils equal round reactive to light and accomodation, extra ocular movements intact  Ears: external canals and TMs free of abnormalities  Nose: patent, without mucosal abnormalities  Mouth and throat: without erythema or lesions of the mucosa  Neck:  no adenopathy, trachea midline and normal to palpation  Cardiovascular:  regular rate and rhythm, no murmurs, clicks, or gallops  Respiratory:  normal respiratory rate and rhythm, lungs clear to auscultation  SKIN: No jaundice/pallor/rash.   Psychiatric: mentation appears normal and affect normal/bright        A/P    ICD-10-CM    1. Acute non-recurrent maxillary sinusitis J01.00 guaiFENesin-codeine (ROBITUSSIN AC) 100-10 MG/5ML SOLN solution     amoxicillin-clavulanate (AUGMENTIN) 875-125 MG per tablet       DISCUSSION: Given duration of symptoms, recommended he complete 10 day course of Augmentin. He should take this " twice daily with food, and should consider taking probiotic or eating yogurt in between. Warned him of possible side effects including loose stool. Provided him with recommendations  on OTC therapies based on symptoms. Also gave small quantity of codeine solution to use at night. No driving. He should contact me in 1 week if symptoms are not improving, or sooner with any intolerable side effects or worsening symptoms.      follow up visit: As needed    Jody Saenz PA-C  [facility]

## 2018-01-08 NOTE — MR AVS SNAPSHOT
After Visit Summary   1/8/2018    Alejo Antunez    MRN: [id number]           Patient Information     Date Of Birth          1955        Visit Information        Provider Department      1/8/2018 2:00 PM Jody Saenz PA-C [facility]        Today's Diagnoses     Acute non-recurrent maxillary sinusitis    -  1       Follow-ups after your visit        Follow-up notes from your care team     Return if symptoms worsen or fail to improve.      Your next 10 appointments already scheduled     Jan 31, 2018  1:40 PM CST   Lab Appointment with [facility] LAB/XRAY   [facility] ([facility])    [address]  [address]  [facility] [address] [phone]              Who to contact     If you have questions or need follow up information about today's clinic visit or your schedule please contact [facility] directly at [phone].  Normal or non-critical lab and imaging results will be communicated to you by [employer], letter or phone within 4 business days after the clinic has received the results. If you do not hear from us within 7 days, please contact the clinic through Confovist or phone. If you have a critical or abnormal lab result, we will notify you by phone as soon as possible.  Submit refill requests through CITTIO or call your pharmacy and they will forward the refill request to us. Please allow 3 business days for your refill to be completed.          Additional Information About Your Visit        [employer] Information     [employer] gives you secure access to your electronic health record. If you see a primary care provider, you can also send messages to your care team and make appointments. If you have questions, please call your primary care clinic.  If you do not have a primary care provider, please call [phone] and they will assist you.        Care EveryWhere ID     This is your Care  "EveryWhere ID. This could be used by other organizations to access your [facility] medical records  [id number]        Your Vitals Were     Pulse Temperature Height Pulse Oximetry BMI (Body Mass Index)       79 99  F (37.2  C) (Oral) 1.715 m (5' 7.5\") 97% 29.01 kg/m2        Blood Pressure from Last 3 Encounters:   01/08/18 114/78   12/06/17 102/66   11/16/17 112/78    Weight from Last 3 Encounters:   01/08/18 85.3 kg (188 lb)   11/16/17 85 kg (187 lb 6.4 oz)   05/31/17 88.7 kg (195 lb 9.6 oz)              Today, you had the following     No orders found for display         Today's Medication Changes          These changes are accurate as of: 1/8/18  2:27 PM.  If you have any questions, ask your nurse or doctor.               Start taking these medicines.        Dose/Directions    amoxicillin-clavulanate 875-125 MG per tablet   Commonly known as:  AUGMENTIN   Used for:  Acute non-recurrent maxillary sinusitis   Started by:  Jody Saenz PA-C        Dose:  1 tablet   Take 1 tablet by mouth 2 times daily for 10 days   Quantity:  20 tablet   Refills:  0       guaiFENesin-codeine 100-10 MG/5ML Soln solution   Commonly known as:  ROBITUSSIN AC   Used for:  Acute non-recurrent maxillary sinusitis   Started by:  Jody Saenz PA-C        Dose:  1-2 tsp.   Take 5-10 mLs by mouth every 4 hours as needed for cough   Quantity:  236 mL   Refills:  0            Where to get your medicines      [facility] [address] [place], [place] - [address] [place] [address] & [facility]  [address] [facility] [address] [address]     Phone:  [phone]     amoxicillin-clavulanate 875-125 MG per tablet         Some of these will need a paper prescription and others can be bought over the counter.  Ask your nurse if you have questions.     Bring a paper prescription for each of these medications     guaiFENesin-codeine 100-10 MG/5ML Soln solution                Primary Care " Provider Office Phone # Fax #    Americo Brown [phone] [address] [facility] [address]  [facility] [address]        Equal Access to Services     FERNANDO MARAVILLAEARL : Hadii aad ku hadarono Soomaali, waaxda luqadaha, qaybta kaalmada adeegyada, kelly felizshan chas. So [facility] [phone].    ATENCIÓN: Si habla español, tiene a conroy disposición servicios gratuitos de asistencia lingüística. Llame al [phone].    We comply with applicable federal civil rights laws and [place] laws. We do not discriminate on the basis of race, color, national origin, age, disability, sex, sexual orientation, or gender identity.            Thank you!     Thank you for choosing [facility]  for your care. Our goal is always to provide you with excellent care. Hearing back from our patients is one way we can continue to improve our services. Please take a few minutes to complete the written survey that you may receive in the mail after your visit with us. Thank you!             Your Updated Medication List - Protect others around you: Learn how to safely use, store and throw away your medicines at www.disposemymeds.org.          This list is accurate as of: 1/8/18  2:27 PM.  Always use your most recent med list.                   Brand Name Dispense Instructions for use Diagnosis    ALPRAZolam 0.5 MG tablet    XANAX    10 tablet    Take 1 tablet (0.5 mg) by mouth 3 times daily as needed for anxiety    Fear of flying       amoxicillin-clavulanate 875-125 MG per tablet    AUGMENTIN    20 tablet    Take 1 tablet by mouth 2 times daily for 10 days    Acute non-recurrent maxillary sinusitis       finasteride 5 MG tablet    PROSCAR     Take 5 mg by mouth daily        guaiFENesin-codeine 100-10 MG/5ML Soln solution    ROBITUSSIN AC    236 mL    Take 5-10 mLs by mouth every 4 hours as needed for cough    Acute non-recurrent maxillary sinusitis       ibuprofen 800 MG tablet     ADVIL/MOTRIN    60 tablet    Take 1 tablet (800 mg) by mouth every 8 hours as needed for moderate pain    Hand injury, left, initial encounter       tacrolimus 0.1 % ointment    PROTOPIC     Apply topically 2 times daily

## 2018-01-08 NOTE — NURSING NOTE
Alejo is here for a cold X 11 days, sore throat, headache, cough, post nasal drip    Pre-Visit Screening :  Immunizations : up to date  Colon Screening : is up to date  Asthma Action Test/Plan : yuniel  PHQ9/GAD7 :  Na    Pulse - regular  My Chart - accepts    CLASSIFICATION OF OVERWEIGHT AND OBESITY BY BMI                         Obesity Class           BMI(kg/m2)  Underweight                                    < 18.5  Normal                                         18.5-24.9  Overweight                                     25.0-29.9  OBESITY                     I                  30.0-34.9                              II                 35.0-39.9  EXTREME OBESITY             III                >40                             Patient's  BMI Body mass index is 22.15 kg/(m^2).  http://hin.nhlbi.nih.gov/menuplanner/menu.cgi  Questioned patient about current smoking habits.  Pt. currently smokes.  Advised about smoking cessation.  The patient has verbalized that it is ok to leave a detailed voice message on the patient's cell phone with results/recommendations from this visit.       Verified [phone] phone number:

## 2018-01-16 ENCOUNTER — MYC MEDICAL ADVICE (OUTPATIENT)
Dept: FAMILY MEDICINE | Facility: CLINIC | Age: 63
End: 2018-01-16

## 2018-01-16 DIAGNOSIS — J01.00 ACUTE NON-RECURRENT MAXILLARY SINUSITIS: Primary | ICD-10-CM

## 2018-01-16 RX ORDER — AZITHROMYCIN 250 MG/1
TABLET, FILM COATED ORAL
Qty: 6 TABLET | Refills: 0 | Status: SHIPPED | OUTPATIENT
Start: 2018-01-16 | End: 2018-04-19

## 2018-01-16 NOTE — TELEPHONE ENCOUNTER
From: Alejo Antunez  To: Jody Saenz PA-C  Sent: 1/16/2018 2:12 PM CST  Subject: A follow-up question for a visit within the past seven days    Hi,    Last week I came in for a persistent head/chest cold. I got a Rx for amoxicillin. I took that for a day but got extremely nauseated which caused vomiting. I stopped taking the Rx. I felt I was getting better for a couple days, however I started to get bad again a day and a half ago. Very plugged sinuses and loose congestion with the cough. Is there any type of milder Rx (Z pack) etc.. I could get? I am leaving for [place] tomorrow morning.    Thanks,    Alejo Antunez

## 2018-01-31 DIAGNOSIS — R97.20 ELEVATED PROSTATE SPECIFIC ANTIGEN (PSA): Primary | ICD-10-CM

## 2018-01-31 PROCEDURE — 84153 ASSAY OF PSA TOTAL: CPT | Mod: 90 | Performed by: FAMILY MEDICINE

## 2018-01-31 PROCEDURE — 36415 COLL VENOUS BLD VENIPUNCTURE: CPT | Performed by: FAMILY MEDICINE

## 2018-01-31 NOTE — NURSING NOTE
Orders from     Pt here for lab only blood draw, has orders from  Dr. Khalif [facility].  Will fax labs when resulted.   [phone]

## 2018-02-01 LAB — ABBOTT PSA - QUEST: 1.9 NG/ML

## 2018-02-03 ENCOUNTER — TELEPHONE (OUTPATIENT)
Dept: FAMILY MEDICINE | Facility: CLINIC | Age: 63
End: 2018-02-03

## 2018-02-09 ENCOUNTER — TRANSFERRED RECORDS (OUTPATIENT)
Dept: FAMILY MEDICINE | Facility: CLINIC | Age: 63
End: 2018-02-09

## 2018-02-13 ENCOUNTER — RECORDS - HEALTHEAST (OUTPATIENT)
Dept: ADMINISTRATIVE | Facility: OTHER | Age: 63
End: 2018-02-13

## 2018-04-19 ENCOUNTER — OFFICE VISIT (OUTPATIENT)
Dept: FAMILY MEDICINE | Facility: CLINIC | Age: 63
End: 2018-04-19

## 2018-04-19 VITALS
SYSTOLIC BLOOD PRESSURE: 114 MMHG | HEIGHT: 67 IN | HEART RATE: 63 BPM | BODY MASS INDEX: 29.91 KG/M2 | WEIGHT: 190.6 LBS | DIASTOLIC BLOOD PRESSURE: 68 MMHG | TEMPERATURE: 97.9 F | OXYGEN SATURATION: 99 %

## 2018-04-19 DIAGNOSIS — R97.20 ELEVATED PROSTATE SPECIFIC ANTIGEN (PSA): ICD-10-CM

## 2018-04-19 DIAGNOSIS — Z80.42 FAMILY HISTORY OF PROSTATE CANCER: Primary | ICD-10-CM

## 2018-04-19 DIAGNOSIS — Z01.818 PRE-OP EXAM: ICD-10-CM

## 2018-04-19 LAB — HEMOGLOBIN: 16.4 G/DL (ref 13.3–17.7)

## 2018-04-19 PROCEDURE — 36415 COLL VENOUS BLD VENIPUNCTURE: CPT | Performed by: FAMILY MEDICINE

## 2018-04-19 PROCEDURE — 85018 HEMOGLOBIN: CPT | Performed by: FAMILY MEDICINE

## 2018-04-19 PROCEDURE — 99214 OFFICE O/P EST MOD 30 MIN: CPT | Performed by: FAMILY MEDICINE

## 2018-04-19 PROCEDURE — 93000 ELECTROCARDIOGRAM COMPLETE: CPT | Performed by: FAMILY MEDICINE

## 2018-04-19 NOTE — PROGRESS NOTES
[facility]  [address]  [address]  [facility] [address] [phone]  Dept: [phone]    PRE-OP EVALUATION:  Today's date: 2018    Alejo Antunez (: 1955) presents for pre-operative evaluation assessment as requested by Dr. Riggins.  He requires evaluation and anesthesia risk assessment prior to undergoing surgery/procedure for treatment of biopsy .    Proposed Surgery/ Procedure: biopsy  Date of Surgery/ Procedure: 18  Time of Surgery/ Procedure: 7:00 am  Hospital/Surgical Facility: [facility]  Fax number for surgical facility: [phone]  Primary Physician: Americo Brown  Type of Anesthesia Anticipated: to be determined    Patient has a Health Care Directive or Living Will:  YES we have one on file    1. NO - Do you have a history of heart attack, stroke, stent, bypass or surgery on an artery in the head, neck, heart or legs?  2. NO - Do you ever have any pain or discomfort in your chest?  3. NO - Do you have a history of  Heart Failure?  4. NO - Are you troubled by shortness of breath when: walking on the level, up a slight hill or at night?  5. NO - Do you currently have a cold, bronchitis or other respiratory infection?  6. NO - Do you have a cough, shortness of breath or wheezing?  7. NO - Do you sometimes get pains in the calves of your legs when you walk?  8. NO - Do you or anyone in your family have previous history of blood clots?  9. NO - Do you or does anyone in your family have a serious bleeding problem such as prolonged bleeding following surgeries or cuts?  10. NO - Have you ever had problems with anemia or been told to take iron pills?  11. NO - Have you had any abnormal blood loss such as black, tarry or bloody stools, or abnormal vaginal bleeding?  12. NO - Have you ever had a blood transfusion?  13. NO - Have you or any of your relatives ever had problems with anesthesia?  14. NO - Do you have sleep apnea,  excessive snoring or daytime drowsiness?  15. NO - Do you have any prosthetic heart valves?  16. NO - Do you have prosthetic joints?  17. NO - Is there any chance that you may be pregnant?      HPI:     HPI related to upcoming procedure: pt with strong FH prostate CA- biopsy recommended due to atypical cells on bx last year      See problem list for active medical problems.  Problems all longstanding and stable, except as noted/documented.  See ROS for pertinent symptoms related to these conditions.                                                                                                                                                          .    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  Document previously scanned on 2-17-16.  Validation form completed and sent to be scanned.  Code Status needs to be updated to reflect choices in most recent ACP document. Confirmed/documented designated decision maker(s).  Added by Maribeth Easley RN Advance Care Planning Liaison with Philip Holliday  Advance Care Planning 1/12/2016: ACP Review of Chart / Resources Provided:  Reviewed chart for advance care plan.  Alejo Antunez has no plan or code status on file. Discussed available resources and provided with information. Confirmed code status reflects current choices pending further ACP discussions.  Confirmed/documented legally designated decision maker(s). 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     Priority: Medium     CARDIOVASCULAR SCREENING; LDL GOAL LESS THAN 160 07/26/2012     Priority: Medium     Health Care Home 02/23/2015     Priority: Low      Past Medical History:   Diagnosis Date     NO ACTIVE PROBLEMS      Past Surgical History:   Procedure Laterality Date     CATARACT IOL, RT/LT Left      cornea surgery Left      ROTATOR CUFF REPAIR RT/LT Right 2005     TONSILLECTOMY & ADENOIDECTOMY       Current Outpatient Prescriptions   Medication Sig Dispense Refill     ALPRAZolam (XANAX) 0.5 MG tablet Take 1 tablet (0.5 mg) by mouth 3 times daily as needed for anxiety 10 tablet 0     finasteride (PROSCAR) 5 MG tablet Take 5 mg by mouth daily       guaiFENesin-codeine (ROBITUSSIN AC) 100-10 MG/5ML SOLN solution Take 5-10 mLs by mouth every 4 hours as needed for cough 236 mL 0     ibuprofen (ADVIL/MOTRIN) 800 MG tablet Take 1 tablet (800 mg) by mouth every 8 hours as needed for moderate pain 60 tablet 0     tacrolimus (PROTOPIC) 0.1 % ointment Apply topically 2 times daily       OTC products: None, except as noted above    Allergies   Allergen Reactions     Augmentin Nausea     Sulfa Drugs      Eye drops-redness      Latex Allergy: NO    Social History   Substance Use Topics     Smoking status: Current Every Day Smoker     Packs/day: 1.00     Years: 35.00     Types: Cigarettes     Smokeless tobacco: Never Used     Alcohol use 6.0 oz/week     10 Standard drinks or equivalent per week      Comment: 7-8/wk     History   Drug Use No       REVIEW OF SYSTEMS:   CONSTITUTIONAL: NEGATIVE for fever, chills, change in weight  ENT/MOUTH: NEGATIVE for ear, mouth and throat problems  RESP: NEGATIVE for significant cough or SOB  CV: NEGATIVE for chest pain, palpitations or peripheral edema  GI: NEGATIVE for nausea, abdominal pain, heartburn, or change in bowel habits  :  "negative for dysuria, hematuria, decreased urinary stream, erectile dysfunction  PSYCHIATRIC: NEGATIVE for changes in mood or affect    EXAM:   /68 (BP Location: Left arm, Patient Position: Chair, Cuff Size: Adult Regular)  Pulse 63  Temp 97.9  F (36.6  C) (Oral)  Ht 1.702 m (5' 7\")  Wt 86.5 kg (190 lb 9.6 oz)  SpO2 99%  BMI 29.85 kg/m2  GENERAL APPEARANCE: healthy, alert and no distress  HENT: ear canals and TM's normal and nose and mouth without ulcers or lesions  RESP: lungs clear to auscultation - no rales, rhonchi or wheezes  CV: regular rate and rhythm, normal S1 S2, no S3 or S4 and no murmur, click or rub   ABDOMEN: soft, nontender, no HSM or masses and bowel sounds normal  NEURO: Normal strength and tone, sensory exam grossly normal, mentation intact and speech normal    DIAGNOSTICS:     EKG: appears normal, NSR, normal axis, normal intervals, no acute ST/T changes c/w ischemia, no LVH by voltage criteria, unchanged from previous tracings  Labs Resulted Today:   Results for orders placed or performed in visit on 04/19/18   CL AFF HEMOGLOBIN ([facility])   Result Value Ref Range    Hemoglobin 16.4 13.3 - 17.7 g/dL       Recent Labs   Lab Test  11/16/17   1140  03/29/17   0916  10/11/16   1025   01/23/13   0833   HGB   --   16.2   --    --   15.8   PLT   --   254   --    --   263   NA  138   --   138   < >  139   POTASSIUM  5.0   --   4.0   < >  4.5   CR  0.99   --   1.04   < >  1.08    < > = values in this interval not displayed.        IMPRESSION:   Reason for surgery/procedure: prostate bx under sedation    The proposed surgical procedure is considered LOW risk.    REVISED CARDIAC RISK INDEX  The patient has the following serious cardiovascular risks for perioperative complications such as (MI, PE, VFib and 3  AV Block):  No serious cardiac risks  INTERPRETATION: 0 risks: Class I (very low risk - 0.4% complication rate)    The patient has the following additional risks for perioperative " complications:  No identified additional risks      ICD-10-CM    1. Family history of prostate cancer Z80.42    2. Elevated prostate specific antigen (PSA) R97.20    3. Pre-op exam Z01.818        RECOMMENDATIONS:             APPROVAL GIVEN to proceed with proposed procedure, without further diagnostic evaluation       Signed Electronically by: Americo Brown MD    Copy of this evaluation report is provided to requesting physician.    [facility] Preop Guidelines    Revised Cardiac Risk Index

## 2018-04-19 NOTE — MR AVS SNAPSHOT
"              After Visit Summary   4/19/2018    Alejo Antunez    MRN: [id number]           Patient Information     Date Of Birth          1955        Visit Information        Provider Department      4/19/2018 8:30 AM Americo Brown MD [facility]        Today's Diagnoses     Family history of prostate cancer    -  1    Elevated prostate specific antigen (PSA)        Pre-op exam           Follow-ups after your visit        Who to contact     If you have questions or need follow up information about today's clinic visit or your schedule please contact [facility] directly at [phone].  Normal or non-critical lab and imaging results will be communicated to you by Joosyhart, letter or phone within 4 business days after the clinic has received the results. If you do not hear from us within 7 days, please contact the clinic through Joosyhart or phone. If you have a critical or abnormal lab result, we will notify you by phone as soon as possible.  Submit refill requests through Terapio or call your pharmacy and they will forward the refill request to us. Please allow 3 business days for your refill to be completed.          Additional Information About Your Visit        MyChart Information     Terapio gives you secure access to your electronic health record. If you see a primary care provider, you can also send messages to your care team and make appointments. If you have questions, please call your primary care clinic.  If you do not have a primary care provider, please call [phone] and they will assist you.        Care EveryWhere ID     This is your Care EveryWhere ID. This could be used by other organizations to access your [place] medical records  [id number]        Your Vitals Were     Pulse Temperature Height Pulse Oximetry BMI (Body Mass Index)       63 97.9  F (36.6  C) (Oral) 1.702 m (5' 7\") 99% 29.85 kg/m2        Blood Pressure from Last 3 " Encounters:   04/19/18 114/68   01/08/18 114/78   12/06/17 102/66    Weight from Last 3 Encounters:   04/19/18 86.5 kg (190 lb 9.6 oz)   01/08/18 85.3 kg (188 lb)   11/16/17 85 kg (187 lb 6.4 oz)              We Performed the Following     CL AFF HEMOGLOBIN (BFP)     EKG 12-lead complete w/read - Clinics     VENOUS COLLECTION        Primary Care Provider Office Phone # Fax #    Americo David Brown [phone] [address] [facility] [address]  [facility] [address]        Equal Access to Services     [facility]: Hadii aad ku hadasho Somaria eugenia, waaxda luqadaha, qaybta kaalmada ademansiyada, kelly markham . So [facility] [phone].    ATENCIÓN: Si habla español, tiene a conroy disposición servicios gratuitos de asistencia lingüística. [facility] [phone].    We comply with applicable federal civil rights laws and [place] laws. We do not discriminate on the basis of race, color, national origin, age, disability, sex, sexual orientation, or gender identity.            Thank you!     Thank you for choosing [facility]  for your care. Our goal is always to provide you with excellent care. Hearing back from our patients is one way we can continue to improve our services. Please take a few minutes to complete the written survey that you may receive in the mail after your visit with us. Thank you!             Your Updated Medication List - Protect others around you: Learn how to safely use, store and throw away your medicines at www.disposemymeds.org.          This list is accurate as of 4/19/18  9:19 AM.  Always use your most recent med list.                   Brand Name Dispense Instructions for use Diagnosis    ALPRAZolam 0.5 MG tablet    XANAX    10 tablet    Take 1 tablet (0.5 mg) by mouth 3 times daily as needed for anxiety    Fear of flying       finasteride 5 MG tablet    PROSCAR     Take 5 mg by mouth daily        guaiFENesin-codeine 100-10 MG/5ML  Soln solution    ROBITUSSIN AC    236 mL    Take 5-10 mLs by mouth every 4 hours as needed for cough    Acute non-recurrent maxillary sinusitis       ibuprofen 800 MG tablet    ADVIL/MOTRIN    60 tablet    Take 1 tablet (800 mg) by mouth every 8 hours as needed for moderate pain    Hand injury, left, initial encounter       tacrolimus 0.1 % ointment    PROTOPIC     Apply topically 2 times daily

## 2018-04-19 NOTE — NURSING NOTE
Alejo is here for a pre op    Pre-Visit Screening :  Immunizations : up to date  Colon Screening : is up to date  Asthma Action Test/Plan : na  PHQ9/GAD7 :  Na    Pulse - regular  My Chart - accepts    CLASSIFICATION OF OVERWEIGHT AND OBESITY BY BMI                         Obesity Class           BMI(kg/m2)  Underweight                                    < 18.5  Normal                                         18.5-24.9  Overweight                                     25.0-29.9  OBESITY                     I                  30.0-34.9                              II                 35.0-39.9  EXTREME OBESITY             III                >40                             Patient's  BMI Body mass index is 22.15 kg/(m^2).  http://hin.nhlbi.nih.gov/menuplanner/menu.cgi  Questioned patient about current smoking habits.  Pt. currently smokes.  Advised about smoking cessation.  The patient has verbalized that it is ok to leave a detailed voice message on the patient's home voicemail with results/recommendations from this visit.       Verified [phone]  phone number:

## 2018-04-24 ASSESSMENT — MIFFLIN-ST. JEOR: SCORE: 1624.48

## 2018-04-27 ENCOUNTER — RECORDS - HEALTHEAST (OUTPATIENT)
Dept: ADMINISTRATIVE | Facility: OTHER | Age: 63
End: 2018-04-27

## 2018-04-27 ENCOUNTER — SURGERY - HEALTHEAST (OUTPATIENT)
Dept: SURGERY | Facility: AMBULATORY SURGERY CENTER | Age: 63
End: 2018-04-27

## 2018-04-28 ENCOUNTER — MYC MEDICAL ADVICE (OUTPATIENT)
Dept: FAMILY MEDICINE | Facility: CLINIC | Age: 63
End: 2018-04-28

## 2018-04-28 NOTE — TELEPHONE ENCOUNTER
From: Alejo Antunez  To: Americo Brown MD  Sent: 4/28/2018 9:01 AM CDT  Subject: A follow-up question for a visit within the past seven days    Dr. Brown,    Is Dulcolax what you would recommend for improved bowel movements?    Alejo Antunez

## 2018-05-04 ENCOUNTER — TRANSFERRED RECORDS (OUTPATIENT)
Dept: FAMILY MEDICINE | Facility: CLINIC | Age: 63
End: 2018-05-04

## 2018-06-12 ENCOUNTER — OFFICE VISIT (OUTPATIENT)
Dept: FAMILY MEDICINE | Facility: CLINIC | Age: 63
End: 2018-06-12

## 2018-06-12 VITALS
WEIGHT: 193.2 LBS | HEIGHT: 67 IN | SYSTOLIC BLOOD PRESSURE: 110 MMHG | TEMPERATURE: 98.8 F | HEART RATE: 77 BPM | OXYGEN SATURATION: 96 % | BODY MASS INDEX: 30.32 KG/M2 | DIASTOLIC BLOOD PRESSURE: 74 MMHG

## 2018-06-12 DIAGNOSIS — C61 MALIGNANT NEOPLASM OF PROSTATE (H): Primary | ICD-10-CM

## 2018-06-12 DIAGNOSIS — Z01.818 PRE-OP EXAM: ICD-10-CM

## 2018-06-12 LAB
% GRANULOCYTES: 52.9 %
HCT VFR BLD AUTO: 47.1 % (ref 40–53)
HEMOGLOBIN: 16.5 G/DL (ref 13.3–17.7)
LYMPHOCYTES NFR BLD AUTO: 38.1 %
MCH RBC QN AUTO: 32.9 PG (ref 26–33)
MCHC RBC AUTO-ENTMCNC: 35 G/DL (ref 31–36)
MCV RBC AUTO: 93.9 FL (ref 78–100)
MONOCYTES NFR BLD AUTO: 9 %
PLATELET COUNT - QUEST: 258 10^9/L (ref 150–375)
RBC # BLD AUTO: 5.02 10*12/L (ref 4.4–5.9)
WBC # BLD AUTO: 7.2 10*9/L (ref 4–11)

## 2018-06-12 PROCEDURE — 99214 OFFICE O/P EST MOD 30 MIN: CPT | Performed by: FAMILY MEDICINE

## 2018-06-12 PROCEDURE — 93000 ELECTROCARDIOGRAM COMPLETE: CPT | Performed by: FAMILY MEDICINE

## 2018-06-12 PROCEDURE — 85025 COMPLETE CBC W/AUTO DIFF WBC: CPT | Performed by: FAMILY MEDICINE

## 2018-06-12 PROCEDURE — 36415 COLL VENOUS BLD VENIPUNCTURE: CPT | Performed by: FAMILY MEDICINE

## 2018-06-12 NOTE — PROGRESS NOTES
[facility]  [address]  [address]  [facility] [address]  [phone]  Dept: [phone]    PRE-OP EVALUATION:  Today's date: 2018    Alejo Antunez (: 1955) presents for pre-operative evaluation assessment as requested by Dr. Riggins.  He requires evaluation and anesthesia risk assessment prior to undergoing surgery/procedure for treatment of prostate cancer .    Proposed Surgery/ Procedure: Da Evaristo Robotic Prostatectomy  Date of Surgery/ Procedure: 2018  Time of Surgery/ Procedure: 10:00AM  Hospital/Surgical Facility: [facility]  Fax number for surgical facility: [phone]  Primary Physician: Americo Brown  Type of Anesthesia Anticipated: General    Patient has a Health Care Directive or Living Will:  YES (On File)    1. NO - Do you have a history of heart attack, stroke, stent, bypass or surgery on an artery in the head, neck, heart or legs?  2. NO - Do you ever have any pain or discomfort in your chest?  3. NO - Do you have a history of  Heart Failure?  4. NO - Are you troubled by shortness of breath when: walking on the level, up a slight hill or at night?  5. NO - Do you currently have a cold, bronchitis or other respiratory infection?  6. NO - Do you have a cough, shortness of breath or wheezing?  7. NO - Do you sometimes get pains in the calves of your legs when you walk?  8. NO - Do you or anyone in your family have previous history of blood clots?  9. NO - Do you or does anyone in your family have a serious bleeding problem such as prolonged bleeding following surgeries or cuts?  10. NO - Have you ever had problems with anemia or been told to take iron pills?  11. NO - Have you had any abnormal blood loss such as black, tarry or bloody stools, or abnormal vaginal bleeding?  12. NO - Have you ever had a blood transfusion?  13. NO - Have you or any of your relatives ever had problems with anesthesia?  14. NO - Do  you have sleep apnea, excessive snoring or daytime drowsiness?  15. NO - Do you have any prosthetic heart valves?  16. NO - Do you have prosthetic joints?  17. NO - Is there any chance that you may be pregnant?      HPI:     HPI related to upcoming procedure: prostate malignancy      See problem list for active medical problems.  Problems all longstanding and stable, except as noted/documented.  See ROS for pertinent symptoms related to these conditions.                                                                                                                                                          .    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  Document previously scanned on 2-17-16.  Validation form completed and sent to be scanned.  Code Status needs to be updated to reflect choices in most recent ACP document. Confirmed/documented designated decision maker(s).  Added by Maribeth Easley RN Advance Care Planning Liaison with Philip Holliday  Advance Care Planning 1/12/2016: ACP Review of Chart / Resources Provided:  Reviewed chart for advance care plan.  Alejo PETAR Antunez has no plan or code status on file. Discussed available resources and provided with information. Confirmed code status reflects current choices pending further ACP discussions.  Confirmed/documented legally designated decision maker(s). 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     Priority: Medium     CARDIOVASCULAR SCREENING; LDL GOAL LESS THAN 160 07/26/2012     Priority: Medium     Health Care Home 02/23/2015     Priority: Low      Past Medical History:   Diagnosis Date     NO ACTIVE PROBLEMS      Past Surgical History:   Procedure Laterality Date     CATARACT IOL, RT/LT Left      cornea surgery Left      ROTATOR CUFF REPAIR RT/LT Right 2005     TONSILLECTOMY & ADENOIDECTOMY       Current Outpatient Prescriptions   Medication Sig Dispense Refill     ALPRAZolam (XANAX) 0.5 MG tablet Take 1 tablet (0.5 mg) by mouth 3 times daily as needed for anxiety 10 tablet 0     finasteride (PROSCAR) 5 MG tablet Take 5 mg by mouth daily       ibuprofen (ADVIL/MOTRIN) 800 MG tablet Take 1 tablet (800 mg) by mouth every 8 hours as needed for moderate pain 60 tablet 0     tacrolimus (PROTOPIC) 0.1 % ointment Apply topically 2 times daily       OTC products: None, except as noted above    Allergies   Allergen Reactions     Augmentin Nausea     Sulfa Drugs      Eye drops-redness      Latex Allergy: NO    Social History   Substance Use Topics     Smoking status: Current Every Day Smoker     Packs/day: 1.00     Years: 35.00     Types: Cigarettes     Smokeless tobacco: Never Used     Alcohol use 6.0 oz/week     10 Standard drinks or equivalent per week      Comment: 7-8/wk     History   Drug Use No       REVIEW OF SYSTEMS:   CONSTITUTIONAL: NEGATIVE for fever, chills, change in weight  ENT/MOUTH: NEGATIVE for ear, mouth and throat problems  RESP: NEGATIVE for significant cough or SOB  CV: NEGATIVE for chest pain, palpitations or peripheral edema  : negative for dysuria, hematuria, decreased urinary stream, erectile dysfunction    EXAM:   There were no vitals taken for this visit.  GENERAL APPEARANCE: healthy, alert and no distress  HENT: ear canals and TM's normal and nose and mouth without ulcers or  lesions  RESP: lungs clear to auscultation - no rales, rhonchi or wheezes  CV: regular rate and rhythm, normal S1 S2, no S3 or S4 and no murmur, click or rub   ABDOMEN: soft, nontender, no HSM or masses and bowel sounds normal  NEURO: Normal strength and tone, sensory exam grossly normal, mentation intact and speech normal    DIAGNOSTICS:     EKG: appears normal, NSR, normal axis, normal intervals, no acute ST/T changes c/w ischemia, no LVH by voltage criteria, unchanged from previous tracings  Labs Resulted Today:   Results for orders placed or performed in visit on 06/12/18   CL AFF HEMOGRAM/PLATE/DIFF (BFP)   Result Value Ref Range    WBC 7.2 4.0 - 11 10*9/L    RBC Count 5.02 4.4 - 5.9 10*12/L    Hemoglobin 16.5 13.3 - 17.7 g/dL    Hematocrit 47.1 40.0 - 53.0 %    MCV 93.9 78 - 100 fL    MCH 32.9 26 - 33 pg    MCHC 35.0 31 - 36 g/dL    Platelet Count 258 150 - 375 10^9/L    % Granulocytes 52.9 %    % Lymphocytes 38.1 %    % Monocytes 9.0 %       Recent Labs   Lab Test  04/19/18   0856  11/16/17   1140  03/29/17   0916  10/11/16   1025   01/23/13   0833   HGB  16.4   --   16.2   --    --   15.8   PLT   --    --   254   --    --   263   NA   --   138   --   138   < >  139   POTASSIUM   --   5.0   --   4.0   < >  4.5   CR   --   0.99   --   1.04   < >  1.08    < > = values in this interval not displayed.        IMPRESSION:   Reason for surgery/procedure: prostate malignancy    The proposed surgical procedure is considered INTERMEDIATE risk.    REVISED CARDIAC RISK INDEX  The patient has the following serious cardiovascular risks for perioperative complications such as (MI, PE, VFib and 3  AV Block):  No serious cardiac risks  INTERPRETATION: 0 risks: Class I (very low risk - 0.4% complication rate)    The patient has the following additional risks for perioperative complications:  No identified additional risks      ICD-10-CM    1. Malignant neoplasm of prostate (H) C61    2. Pre-op exam Z01.818         RECOMMENDATIONS:             APPROVAL GIVEN to proceed with proposed procedure, without further diagnostic evaluation       Signed Electronically by: Americo Brown MD    Copy of this evaluation report is provided to requesting physician.    [place] Preop Guidelines    Revised Cardiac Risk Index

## 2018-06-12 NOTE — MR AVS SNAPSHOT
"              After Visit Summary   6/12/2018    Alejo Antunez    MRN: [id number]           Patient Information     Date Of Birth          1955        Visit Information        Provider Department      6/12/2018 11:30 AM [facility] MD [facility]        Today's Diagnoses     Malignant neoplasm of prostate (H)    -  1    Pre-op exam           Follow-ups after your visit        Who to contact     If you have questions or need follow up information about today's clinic visit or your schedule please contact [facility] directly at [phone].  Normal or non-critical lab and imaging results will be communicated to you by Polimetrixhart, letter or phone within 4 business days after the clinic has received the results. If you do not hear from us within 7 days, please contact the clinic through Eruptive Gamest or phone. If you have a critical or abnormal lab result, we will notify you by phone as soon as possible.  Submit refill requests through Apex Therapeutics or call your pharmacy and they will forward the refill request to us. Please allow 3 business days for your refill to be completed.          Additional Information About Your Visit        MyChart Information     Apex Therapeutics gives you secure access to your electronic health record. If you see a primary care provider, you can also send messages to your care team and make appointments. If you have questions, please call your primary care clinic.  If you do not have a primary care provider, please call [phone] and they will assist you.        Care EveryWhere ID     This is your Care EveryWhere ID. This could be used by other organizations to access your [place] medical records  [id number]        Your Vitals Were     Pulse Temperature Height Pulse Oximetry BMI (Body Mass Index)       77 98.8  F (37.1  C) (Oral) 1.702 m (5' 7\") 96% 30.26 kg/m2        Blood Pressure from Last 3 Encounters:   06/12/18 110/74   04/19/18 114/68 "   01/08/18 114/78    Weight from Last 3 Encounters:   06/12/18 87.6 kg (193 lb 3.2 oz)   04/19/18 86.5 kg (190 lb 9.6 oz)   01/08/18 85.3 kg (188 lb)              We Performed the Following     CL AFF HEMOGRAM/PLATE/DIFF (BFP)     EKG 12-lead complete w/read - Clinics     VENOUS COLLECTION        Primary Care Provider Office Phone # Fax #    Americo Brown [phone]       [address] [facility] [address]  [facility] [address]        Equal Access to Services     [facility]: Hadii aad ku hadasho Soomaali, waaxda luqadaha, qaybta kaalmada adeegyada, kelly torres hayshylan tiffani markham . So [facility] [phone].    ATENCIÓN: Si habla español, tiene a conroy disposición servicios gratuitos de asistencia lingüística. Ll[facility] [phone].    We comply with applicable federal civil rights laws and [place] laws. We do not discriminate on the basis of race, color, national origin, age, disability, sex, sexual orientation, or gender identity.            Thank you!     Thank you for choosing [facility]  for your care. Our goal is always to provide you with excellent care. Hearing back from our patients is one way we can continue to improve our services. Please take a few minutes to complete the written survey that you may receive in the mail after your visit with us. Thank you!             Your Updated Medication List - Protect others around you: Learn how to safely use, store and throw away your medicines at www.disposemymeds.org.          This list is accurate as of 6/12/18 12:35 PM.  Always use your most recent med list.                   Brand Name Dispense Instructions for use Diagnosis    ALPRAZolam 0.5 MG tablet    XANAX    10 tablet    Take 1 tablet (0.5 mg) by mouth 3 times daily as needed for anxiety    Fear of flying       finasteride 5 MG tablet    PROSCAR     Take 5 mg by mouth daily        ibuprofen 800 MG tablet    ADVIL/MOTRIN    60 tablet    Take 1 tablet (800  mg) by mouth every 8 hours as needed for moderate pain    Hand injury, left, initial encounter       tacrolimus 0.1 % ointment    PROTOPIC     Apply topically 2 times daily

## 2018-06-18 ASSESSMENT — MIFFLIN-ST. JEOR
SCORE: 1604.07
SCORE: 1604.07

## 2018-06-19 ENCOUNTER — RECORDS - HEALTHEAST (OUTPATIENT)
Dept: MEDSURG UNIT | Facility: HOSPITAL | Age: 63
End: 2018-06-19

## 2018-06-19 ENCOUNTER — TRANSFERRED RECORDS (OUTPATIENT)
Dept: FAMILY MEDICINE | Facility: CLINIC | Age: 63
End: 2018-06-19

## 2018-06-19 ENCOUNTER — SURGERY - HEALTHEAST (OUTPATIENT)
Dept: SURGERY | Facility: HOSPITAL | Age: 63
End: 2018-06-19

## 2018-06-19 DIAGNOSIS — C61 PROSTATE CANCER (H): ICD-10-CM

## 2018-06-19 LAB
ABO/RH(D): NORMAL
ANION GAP SERPL CALCULATED.3IONS-SCNC: 9 MMOL/L (ref 5–18)
ANTIBODY SCREEN: NEGATIVE
BUN SERPL-MCNC: 11 MG/DL (ref 8–22)
CALCIUM SERPL-MCNC: 9.1 MG/DL (ref 8.5–10.5)
CHLORIDE BLD-SCNC: 107 MMOL/L (ref 98–107)
CO2 SERPL-SCNC: 25 MMOL/L (ref 22–31)
CREAT SERPL-MCNC: 1.16 MG/DL (ref 0.7–1.3)
ERYTHROCYTE [DISTWIDTH] IN BLOOD BY AUTOMATED COUNT: 13.3 % (ref 11–14.5)
GFR SERPL CREATININE-BSD FRML MDRD: >60 ML/MIN/1.73M2
GLUCOSE BLD-MCNC: 118 MG/DL (ref 70–125)
HCT VFR BLD AUTO: 48 % (ref 40–54)
HGB BLD-MCNC: 16.4 G/DL (ref 14–18)
MCH RBC QN AUTO: 31.5 PG (ref 27–34)
MCHC RBC AUTO-ENTMCNC: 34.2 G/DL (ref 32–36)
MCV RBC AUTO: 92 FL (ref 80–100)
PLATELET # BLD AUTO: 252 THOU/UL (ref 140–440)
PMV BLD AUTO: 9.7 FL (ref 8.5–12.5)
POTASSIUM BLD-SCNC: 3.7 MMOL/L (ref 3.5–5)
RBC # BLD AUTO: 5.21 MILL/UL (ref 4.4–6.2)
SODIUM SERPL-SCNC: 141 MMOL/L (ref 136–145)
WBC: 6.6 THOU/UL (ref 4–11)

## 2018-06-20 LAB — HGB BLD-MCNC: 13.7 G/DL (ref 14–18)

## 2018-07-03 LAB
LAB AP CHARGES (HE HISTORICAL CONVERSION): ABNORMAL
PATH REPORT.ADDENDUM SPEC: ABNORMAL
PATH REPORT.COMMENTS IMP SPEC: ABNORMAL
PATH REPORT.COMMENTS IMP SPEC: ABNORMAL
PATH REPORT.FINAL DX SPEC: ABNORMAL
PATH REPORT.GROSS SPEC: ABNORMAL
PATH REPORT.MICROSCOPIC SPEC OTHER STN: ABNORMAL
PATH REPORT.RELEVANT HX SPEC: ABNORMAL
PATHOLOGY SYNOPTIC REPORT: ABNORMAL
RESULT FLAG (HE HISTORICAL CONVERSION): ABNORMAL

## 2018-09-07 ENCOUNTER — TRANSFERRED RECORDS (OUTPATIENT)
Dept: FAMILY MEDICINE | Facility: CLINIC | Age: 63
End: 2018-09-07

## 2018-10-02 DIAGNOSIS — Z23 NEED FOR VACCINATION: Primary | ICD-10-CM

## 2018-10-02 DIAGNOSIS — Z80.42 FAMILY HISTORY OF PROSTATE CANCER: ICD-10-CM

## 2018-10-02 PROCEDURE — 90670 PCV13 VACCINE IM: CPT | Performed by: FAMILY MEDICINE

## 2018-10-02 PROCEDURE — 36415 COLL VENOUS BLD VENIPUNCTURE: CPT | Performed by: FAMILY MEDICINE

## 2018-10-02 PROCEDURE — G0009 ADMIN PNEUMOCOCCAL VACCINE: HCPCS | Performed by: FAMILY MEDICINE

## 2018-10-03 LAB — ABBOTT PSA - QUEST: <0.1 NG/ML

## 2018-10-25 ENCOUNTER — ALLIED HEALTH/NURSE VISIT (OUTPATIENT)
Dept: FAMILY MEDICINE | Facility: CLINIC | Age: 63
End: 2018-10-25

## 2018-10-25 DIAGNOSIS — Z23 NEED FOR VACCINATION: Primary | ICD-10-CM

## 2018-10-25 PROCEDURE — 90686 IIV4 VACC NO PRSV 0.5 ML IM: CPT | Performed by: FAMILY MEDICINE

## 2018-10-25 PROCEDURE — G0008 ADMIN INFLUENZA VIRUS VAC: HCPCS | Performed by: FAMILY MEDICINE

## 2018-10-25 NOTE — MR AVS SNAPSHOT
After Visit Summary   10/25/2018    Alejo Antunez    MRN: [id number]           Patient Information     Date Of Birth          1955        Visit Information        Provider Department      10/25/2018 4:00 PM Americo Brown MD [facility]        Today's Diagnoses     Need for vaccination    -  1       Follow-ups after your visit        Who to contact     If you have questions or need follow up information about today's clinic visit or your schedule please contact [facility] directly at [phone].  Normal or non-critical lab and imaging results will be communicated to you by CreditEasehart, letter or phone within 4 business days after the clinic has received the results. If you do not hear from us within 7 days, please contact the clinic through Azubut or phone. If you have a critical or abnormal lab result, we will notify you by phone as soon as possible.  Submit refill requests through [employer] or call your pharmacy and they will forward the refill request to us. Please allow 3 business days for your refill to be completed.          Additional Information About Your Visit        MyChart Information     [employer] gives you secure access to your electronic health record. If you see a primary care provider, you can also send messages to your care team and make appointments. If you have questions, please call your primary care clinic.  If you do not have a primary care provider, please call [phone] and they will assist you.        Care EveryWhere ID     This is your Care EveryWhere ID. This could be used by other organizations to access your [place] medical records  [id number]         Blood Pressure from Last 3 Encounters:   06/12/18 110/74   04/19/18 114/68   01/08/18 114/78    Weight from Last 3 Encounters:   06/12/18 87.6 kg (193 lb 3.2 oz)   04/19/18 86.5 kg (190 lb 9.6 oz)   01/08/18 85.3 kg (188 lb)              We Performed the  Following     HC FLU VAC PRESRV FREE QUAD SPLIT VIR 3+YRS IM     VACCINE ADMINISTRATION, INITIAL        Primary Care Provider Office Phone # Fax #    Americo Brown [phone] [address] [facility] [address]  [facility] [address]        Equal Access to Services     [facility]NA[facility] : Hadii aad ku hadasho Soomaali, waaxda luqadaha, qaybta kaalmada adeegyada, waxay joseluisin hayshylan tiffani kimo shahrzad . So [facility] [phone].    ATENCIÓN: Si habla español, tiene a conroy disposición servicios gratuitos de asistencia lingüística. Germaine al [phone].    We comply with applicable federal civil rights laws and [place] laws. We do not discriminate on the basis of race, color, national origin, age, disability, sex, sexual orientation, or gender identity.            Thank you!     Thank you for choosing [facility]  for your care. Our goal is always to provide you with excellent care. Hearing back from our patients is one way we can continue to improve our services. Please take a few minutes to complete the written survey that you may receive in the mail after your visit with us. Thank you!             Your Updated Medication List - Protect others around you: Learn how to safely use, store and throw away your medicines at www.disposemymeds.org.          This list is accurate as of 10/25/18  4:23 PM.  Always use your most recent med list.                   Brand Name Dispense Instructions for use Diagnosis    ALPRAZolam 0.5 MG tablet    XANAX    10 tablet    Take 1 tablet (0.5 mg) by mouth 3 times daily as needed for anxiety    Fear of flying       finasteride 5 MG tablet    PROSCAR     Take 5 mg by mouth daily        ibuprofen 800 MG tablet    ADVIL/MOTRIN    60 tablet    Take 1 tablet (800 mg) by mouth every 8 hours as needed for moderate pain    Hand injury, left, initial encounter       tacrolimus 0.1 % ointment    PROTOPIC     Apply topically 2 times daily

## 2019-01-04 ENCOUNTER — TRANSFERRED RECORDS (OUTPATIENT)
Dept: FAMILY MEDICINE | Facility: CLINIC | Age: 64
End: 2019-01-04

## 2019-01-09 DIAGNOSIS — Z85.46 HISTORY OF PROSTATE CANCER: Primary | ICD-10-CM

## 2019-01-09 PROCEDURE — 36415 COLL VENOUS BLD VENIPUNCTURE: CPT | Performed by: FAMILY MEDICINE

## 2019-01-10 LAB — ABBOTT PSA - QUEST: <0.1 NG/ML

## 2019-04-10 DIAGNOSIS — Z80.42 FAMILY HISTORY OF PROSTATE CANCER: Primary | ICD-10-CM

## 2019-04-10 PROCEDURE — 36415 COLL VENOUS BLD VENIPUNCTURE: CPT | Performed by: FAMILY MEDICINE

## 2019-04-11 LAB — ABBOTT PSA - QUEST: <0.1 NG/ML

## 2019-06-26 DIAGNOSIS — Z80.42 FAMILY HISTORY OF PROSTATE CANCER: Primary | ICD-10-CM

## 2019-06-26 PROCEDURE — 36415 COLL VENOUS BLD VENIPUNCTURE: CPT | Performed by: FAMILY MEDICINE

## 2019-06-26 NOTE — NURSING NOTE
Lab only     [facility]  Phone [phone]  Dr. Marcos Riggins  Fax [phone]        PT DUE FOR OV NEXT VISIT  Last ov was 6-

## 2019-06-27 LAB — ABBOTT PSA - QUEST: <0.1 NG/ML

## 2019-07-12 ENCOUNTER — TRANSFERRED RECORDS (OUTPATIENT)
Dept: FAMILY MEDICINE | Facility: CLINIC | Age: 64
End: 2019-07-12

## 2019-08-12 ENCOUNTER — MYC MEDICAL ADVICE (OUTPATIENT)
Dept: FAMILY MEDICINE | Facility: CLINIC | Age: 64
End: 2019-08-12

## 2019-08-12 DIAGNOSIS — F40.243 FEAR OF FLYING: ICD-10-CM

## 2019-08-12 RX ORDER — ALPRAZOLAM 0.5 MG
0.5 TABLET ORAL 3 TIMES DAILY PRN
Qty: 10 TABLET | Refills: 0 | Status: SHIPPED | OUTPATIENT
Start: 2019-08-12 | End: 2019-10-22

## 2019-08-22 ENCOUNTER — ALLIED HEALTH/NURSE VISIT (OUTPATIENT)
Dept: FAMILY MEDICINE | Facility: CLINIC | Age: 64
End: 2019-08-22

## 2019-08-22 DIAGNOSIS — Z23 NEED FOR VACCINATION: Primary | ICD-10-CM

## 2019-08-22 PROCEDURE — 90471 IMMUNIZATION ADMIN: CPT | Performed by: FAMILY MEDICINE

## 2019-08-22 PROCEDURE — 90750 HZV VACC RECOMBINANT IM: CPT | Performed by: FAMILY MEDICINE

## 2019-09-27 ENCOUNTER — HEALTH MAINTENANCE LETTER (OUTPATIENT)
Age: 64
End: 2019-09-27

## 2019-10-22 ENCOUNTER — OFFICE VISIT (OUTPATIENT)
Dept: FAMILY MEDICINE | Facility: CLINIC | Age: 64
End: 2019-10-22

## 2019-10-22 VITALS
TEMPERATURE: 97.6 F | WEIGHT: 181.4 LBS | DIASTOLIC BLOOD PRESSURE: 62 MMHG | HEART RATE: 68 BPM | BODY MASS INDEX: 28.41 KG/M2 | SYSTOLIC BLOOD PRESSURE: 112 MMHG | RESPIRATION RATE: 20 BRPM

## 2019-10-22 DIAGNOSIS — C61 MALIGNANT NEOPLASM OF PROSTATE (H): Primary | ICD-10-CM

## 2019-10-22 DIAGNOSIS — Z85.46 PERSONAL HISTORY OF MALIGNANT NEOPLASM OF PROSTATE: ICD-10-CM

## 2019-10-22 PROCEDURE — 99213 OFFICE O/P EST LOW 20 MIN: CPT | Performed by: FAMILY MEDICINE

## 2019-10-22 PROCEDURE — 36415 COLL VENOUS BLD VENIPUNCTURE: CPT | Performed by: FAMILY MEDICINE

## 2019-10-22 NOTE — NURSING NOTE
Alejo PETAR Coxbree is here for a visit so her can get his labs done for Urology    Questioned patient about current smoking habits.  Pt. currently smokes.  Advised about smoking cessation.  PULSE regular  My Chart: active  CLASSIFICATION OF OVERWEIGHT AND OBESITY BY BMI                        Obesity Class           BMI(kg/m2)  Underweight                                    < 18.5  Normal                                         18.5-24.9  Overweight                                     25.0-29.9  OBESITY                     I                  30.0-34.9                             II                 35.0-39.9  EXTREME OBESITY             III                >40                            Patient's  BMI Body mass index is 28.41 kg/m .  http://hin.nhlbi.nih.gov/menuplanner/menu.cgi  Pre-visit planning  Immunizations - up to date  Colonoscopy - is up to date  Mammogram -   Asthma -   PHQ9 -    CHAPO-7 -

## 2019-10-22 NOTE — PROGRESS NOTES
SUBJECTIVE:  Alejo Antunez, a 64 year old male scheduled an appointment to discuss the following issues:     Family history of prostate cancer  Malignant neoplasm of prostate (H)  Personal history of malignant neoplasm of prostate  Pt here for PSA, no new issues  Medical, social, surgical, and family histories reviewed.    Patient Active Problem List   Diagnosis     CARDIOVASCULAR SCREENING; LDL GOAL LESS THAN 160     Traumatic cataract of eye     Tobacco use disorder     Family history of prostate cancer     Health Care Home     Fear of flying     ACP (advance care planning)     Other abnormal glucose     Personal history of tobacco use, presenting hazards to health     Psoriasis annularis     Past Medical History:   Diagnosis Date     NO ACTIVE PROBLEMS      Family History   Problem Relation Age of Onset     Eye Disorder Mother         glaucoma     Prostate Cancer Father      Social History     Socioeconomic History     Marital status:      Spouse name: Not on file     Number of children: Not on file     Years of education: Not on file     Highest education level: Not on file   Occupational History     Comment:  school system    Social Needs     Financial resource strain: Not on file     Food insecurity:     Worry: Not on file     Inability: Not on file     Transportation needs:     Medical: Not on file     Non-medical: Not on file   Tobacco Use     Smoking status: Current Every Day Smoker     Packs/day: 1.00     Years: 35.00     Pack years: 35.00     Types: Cigarettes     Smokeless tobacco: Never Used   Substance and Sexual Activity     Alcohol use: Yes     Alcohol/week: 10.0 standard drinks     Types: 10 Standard drinks or equivalent per week     Comment: 7-8/wk     Drug use: No     Sexual activity: Yes     Partners: Female   Lifestyle     Physical activity:     Days per week: Not on file     Minutes per session: Not on file     Stress: Not on file   Relationships     Social connections:      Talks on phone: Not on file     Gets together: Not on file     Attends [employer] service: Not on file     Active member of club or organization: Not on file     Attends meetings of clubs or organizations: Not on file     Relationship status: Not on file     Intimate partner violence:     Fear of current or ex partner: Not on file     Emotionally abused: Not on file     Physically abused: Not on file     Forced sexual activity: Not on file   Other Topics Concern     Parent/sibling w/ CABG, MI or angioplasty before 65F 55M? No      Service No     Blood Transfusions No     Caffeine Concern No     Occupational Exposure No     Hobby Hazards No     Sleep Concern No     Stress Concern No     Weight Concern No     Special Diet No     Back Care No     Exercise Not Asked     Bike Helmet Not Asked     Seat Belt Yes     Self-Exams Not Asked   Social History Narrative     Not on file     Past Surgical History:   Procedure Laterality Date     CATARACT IOL, RT/LT Left      cornea surgery Left      ROTATOR CUFF REPAIR RT/LT Right 2005     TONSILLECTOMY & ADENOIDECTOMY       No current outpatient medications on file prior to visit.  No current facility-administered medications on file prior to visit.        Allergies: Augmentin and Sulfa drugs    Immunization History   Administered Date(s) Administered     Influenza Vaccine IM > 6 months Valent IIV4 11/19/2015, 10/11/2016, 10/19/2017, 10/25/2018     Pneumo Conj 13-V (2010&after) 10/02/2018     TD (ADULT, 7+) 02/07/2005     TDAP Vaccine (Boostrix) 02/05/2015     Zoster vaccine recombinant adjuvanted (SHINGRIX) 08/22/2019     Zoster vaccine, live 01/30/2015        ROS:  CONSTITUTIONAL: NEGATIVE for fever, chills  EYES: NEGATIVE for vision changes   RESP: NEGATIVE for significant cough or SOB  CV: NEGATIVE for chest pain, palpitations   GI: NEGATIVE for nausea, abdominal pain, heartburn, or change in bowel habits  : NEGATIVE for frequency, dysuria, or  hematuria  MUSCULOSKELETAL: NEGATIVE for significant arthralgias or myalgia  NEURO: NEGATIVE for weakness, dizziness or paresthesias or headache    OBJECTIVE:  /62 (BP Location: Right arm, Patient Position: Chair, Cuff Size: Adult Regular)   Pulse 68   Temp 97.6  F (36.4  C) (Oral)   Resp 20   Wt 82.3 kg (181 lb 6.4 oz)   BMI 28.41 kg/m    EXAM:  GENERAL APPEARANCE: healthy, alert and no distress  RESP: lungs clear to auscultation - no rales, rhonchi or wheezes  CV: regular rates and rhythm, normal S1 S2, no S3 or S4 and no murmur, click or rub -  ABDOMEN:  soft, nontender, no HSM or masses and bowel sounds normal    ASSESSMENT/PLAN:      (C61) Malignant neoplasm of prostate (H)  Comment: doing well post radical prostatectomy last year  Plan: HCL PSA, SCREENING ([facility]), VENOUS COLLECTION            (Z85.46) Personal history of malignant neoplasm of prostate  Comment:   Plan: HCL PSA, SCREENING ([facility]), VENOUS COLLECTION

## 2019-10-23 LAB — ABBOTT PSA - QUEST: <0.1 NG/ML

## 2019-11-14 ENCOUNTER — ALLIED HEALTH/NURSE VISIT (OUTPATIENT)
Dept: FAMILY MEDICINE | Facility: CLINIC | Age: 64
End: 2019-11-14

## 2019-11-14 DIAGNOSIS — Z23 NEED FOR VACCINATION: Primary | ICD-10-CM

## 2019-11-14 PROCEDURE — G0008 ADMIN INFLUENZA VIRUS VAC: HCPCS | Performed by: FAMILY MEDICINE

## 2019-11-14 PROCEDURE — 90686 IIV4 VACC NO PRSV 0.5 ML IM: CPT | Performed by: FAMILY MEDICINE

## 2019-11-19 ENCOUNTER — ALLIED HEALTH/NURSE VISIT (OUTPATIENT)
Dept: FAMILY MEDICINE | Facility: CLINIC | Age: 64
End: 2019-11-19

## 2019-11-19 DIAGNOSIS — Z23 NEED FOR VACCINATION: Primary | ICD-10-CM

## 2019-11-19 PROCEDURE — 90471 IMMUNIZATION ADMIN: CPT | Performed by: FAMILY MEDICINE

## 2019-11-19 PROCEDURE — 90750 HZV VACC RECOMBINANT IM: CPT | Performed by: FAMILY MEDICINE

## 2019-12-04 ENCOUNTER — ALLIED HEALTH/NURSE VISIT (OUTPATIENT)
Dept: FAMILY MEDICINE | Facility: CLINIC | Age: 64
End: 2019-12-04

## 2019-12-04 DIAGNOSIS — Z23 NEED FOR VACCINATION: Primary | ICD-10-CM

## 2019-12-04 PROCEDURE — 90732 PPSV23 VACC 2 YRS+ SUBQ/IM: CPT | Performed by: FAMILY MEDICINE

## 2019-12-04 PROCEDURE — G0009 ADMIN PNEUMOCOCCAL VACCINE: HCPCS | Performed by: FAMILY MEDICINE

## 2019-12-10 ENCOUNTER — OFFICE VISIT (OUTPATIENT)
Dept: FAMILY MEDICINE | Facility: CLINIC | Age: 64
End: 2019-12-10

## 2019-12-10 VITALS
SYSTOLIC BLOOD PRESSURE: 122 MMHG | RESPIRATION RATE: 20 BRPM | TEMPERATURE: 98.5 F | BODY MASS INDEX: 29.26 KG/M2 | WEIGHT: 186.4 LBS | HEART RATE: 68 BPM | DIASTOLIC BLOOD PRESSURE: 72 MMHG | HEIGHT: 67 IN

## 2019-12-10 DIAGNOSIS — R73.09 OTHER ABNORMAL GLUCOSE: ICD-10-CM

## 2019-12-10 DIAGNOSIS — E78.5 HYPERLIPIDEMIA LDL GOAL <100: ICD-10-CM

## 2019-12-10 DIAGNOSIS — C61 MALIGNANT NEOPLASM OF PROSTATE (H): ICD-10-CM

## 2019-12-10 DIAGNOSIS — Z87.891 PERSONAL HISTORY OF TOBACCO USE, PRESENTING HAZARDS TO HEALTH: ICD-10-CM

## 2019-12-10 DIAGNOSIS — Z00.00 ROUTINE GENERAL MEDICAL EXAMINATION AT A HEALTH CARE FACILITY: Primary | ICD-10-CM

## 2019-12-10 LAB
ALBUMIN SERPL-MCNC: 4.6 G/DL (ref 3.6–5.1)
ALBUMIN/GLOB SERPL: 1.9 {RATIO} (ref 1–2.5)
ALP SERPL-CCNC: 61 U/L (ref 33–130)
ALT 1742-6: 5 U/L (ref 0–32)
AST 1920-8: 0 U/L (ref 0–35)
BILIRUB SERPL-MCNC: 0.9 MG/DL (ref 0.2–1.2)
BUN SERPL-MCNC: 13 MG/DL (ref 7–25)
BUN/CREATININE RATIO: 12.6 (ref 6–22)
CALCIUM SERPL-MCNC: 9.3 MG/DL (ref 8.6–10.3)
CHLORIDE SERPLBLD-SCNC: 104.9 MMOL/L (ref 98–110)
CHOLEST SERPL-MCNC: 192 MG/DL (ref 0–199)
CHOLEST/HDLC SERPL: 2 {RATIO} (ref 0–5)
CO2 SERPL-SCNC: 25.1 MMOL/L (ref 20–32)
CREAT SERPL-MCNC: 1.03 MG/DL (ref 0.7–1.18)
GLOBULIN, CALCULATED - QUEST: 2.4 (ref 1.9–3.7)
GLUCOSE SERPL-MCNC: 115 MG/DL (ref 60–99)
HDLC SERPL-MCNC: 78 MG/DL (ref 40–150)
LDLC SERPL CALC-MCNC: 97 MG/DL (ref 0–130)
POTASSIUM SERPL-SCNC: 4.73 MMOL/L (ref 3.5–5.3)
PROT SERPL-MCNC: 7 G/DL (ref 6.1–8.1)
SODIUM SERPL-SCNC: 142.8 MMOL/L (ref 135–146)
TRIGL SERPL-MCNC: 85 MG/DL (ref 0–149)

## 2019-12-10 PROCEDURE — 99397 PER PM REEVAL EST PAT 65+ YR: CPT | Performed by: FAMILY MEDICINE

## 2019-12-10 PROCEDURE — 80061 LIPID PANEL: CPT | Performed by: FAMILY MEDICINE

## 2019-12-10 PROCEDURE — 36415 COLL VENOUS BLD VENIPUNCTURE: CPT | Performed by: FAMILY MEDICINE

## 2019-12-10 PROCEDURE — 80053 COMPREHEN METABOLIC PANEL: CPT | Performed by: FAMILY MEDICINE

## 2019-12-10 SDOH — HEALTH STABILITY: MENTAL HEALTH: HOW OFTEN DO YOU HAVE 6 OR MORE DRINKS ON ONE OCCASION?: NEVER

## 2019-12-10 SDOH — HEALTH STABILITY: MENTAL HEALTH: HOW MANY STANDARD DRINKS CONTAINING ALCOHOL DO YOU HAVE ON A TYPICAL DAY?: 1 OR 2

## 2019-12-10 SDOH — HEALTH STABILITY: MENTAL HEALTH: HOW OFTEN DO YOU HAVE A DRINK CONTAINING ALCOHOL?: 4 OR MORE TIMES A WEEK

## 2019-12-10 ASSESSMENT — MIFFLIN-ST. JEOR: SCORE: 1580.16

## 2019-12-10 NOTE — NURSING NOTE
Alejo Antunez is here for a CPX.    Pre-visit planning  Immunizations -up to date  Colonoscopy -is up to date  Mammogram -  Asthma test --  PHQ9 -  CHAPO 7 -    Questioned patient about current smoking habits.  Pt. quit smoking some time ago.  Body mass index is 29.41 kg/m .  PULSE regular  My Chart: active  CLASSIFICATION OF OVERWEIGHT AND OBESITY BY BMI                        Obesity Class           BMI(kg/m2)  Underweight                                    < 18.5  Normal                                         18.5-24.9  Overweight                                     25.0-29.9  OBESITY                     I                  30.0-34.9                             II                 35.0-39.9  EXTREME OBESITY             III                >40                            Patient's  BMI Body mass index is 29.41 kg/m .  Http://hin.nhlbi.nih.gov/menuplanner/menu.cgi

## 2019-12-10 NOTE — PROGRESS NOTES
3  SUBJECTIVE:   CC: Alejo Antunez is an 64 year old male who presents for preventive health visit.     Healthy Habits:    Do you get at least three servings of calcium containing foods daily (dairy, green leafy vegetables, etc.)? yes    Amount of exercise or daily activities, outside of work: 4 day(s) per week    Problems taking medications regularly No    Medication side effects: No    Have you had an eye exam in the past two years? yes    Do you see a dentist twice per year? yes    Do you have sleep apnea, excessive snoring or daytime drowsiness?no          Today's PHQ-2 Score:   PHQ-2 ( 1999 Pfizer) 12/10/2019 11/16/2017   Q1: Little interest or pleasure in doing things 0 0   Q2: Feeling down, depressed or hopeless 0 0   PHQ-2 Score 0 0   Q1: Little interest or pleasure in doing things - -   Q2: Feeling down, depressed or hopeless - -   PHQ-2 Score - -       Abuse: Current or Past(Physical, Sexual or Emotional)- No  Do you feel safe in your environment? Yes        Social History     Tobacco Use     Smoking status: Former Smoker     Packs/day: 1.00     Years: 35.00     Pack years: 35.00     Types: Cigarettes     Smokeless tobacco: Never Used   Substance Use Topics     Alcohol use: Yes     Alcohol/week: 10.0 standard drinks     Types: 10 Standard drinks or equivalent per week     Frequency: 4 or more times a week     Drinks per session: 1 or 2     Binge frequency: Never     If you drink alcohol do you typically have >3 drinks per day or >7 drinks per week? No                      Last PSA:   [facility] PSA   Date Value Ref Range Status   10/22/2019 <0.1 < OR = 4.0 ng/mL Final     Comment:     The total PSA value from this assay system is   standardized against the WHO standard. The test   result will be approximately 20% lower when compared   to the equimolar-standardized total PSA (Clarence   Purlear). Comparison of serial PSA results should be   interpreted with this fact in mind.     This test was performed using  the Siemens   chemiluminescent method. Values obtained from   different assay methods cannot be used  interchangeably. PSA levels, regardless of  value, should not be interpreted as absolute  evidence of the presence or absence of disease.         Reviewed orders with patient. Reviewed health maintenance and updated orders accordingly - Yes  BP Readings from Last 3 Encounters:   12/10/19 122/72   10/22/19 112/62   06/12/18 110/74    Wt Readings from Last 3 Encounters:   12/10/19 84.6 kg (186 lb 6.4 oz)   10/22/19 82.3 kg (181 lb 6.4 oz)   06/12/18 87.6 kg (193 lb 3.2 oz)                  Patient Active Problem List   Diagnosis     CARDIOVASCULAR SCREENING; LDL GOAL LESS THAN 160     Traumatic cataract of eye     Tobacco use disorder     Family history of prostate cancer     Health Care Home     Fear of flying     ACP (advance care planning)     Other abnormal glucose     Personal history of tobacco use, presenting hazards to health     Psoriasis annularis     Malignant neoplasm of prostate (H)     Past Surgical History:   Procedure Laterality Date     CATARACT IOL, RT/LT Left      cornea surgery Left      ROTATOR CUFF REPAIR RT/LT Right 2005     TONSILLECTOMY & ADENOIDECTOMY         Social History     Tobacco Use     Smoking status: Former Smoker     Packs/day: 1.00     Years: 35.00     Pack years: 35.00     Types: Cigarettes     Smokeless tobacco: Never Used   Substance Use Topics     Alcohol use: Yes     Alcohol/week: 10.0 standard drinks     Types: 10 Standard drinks or equivalent per week     Frequency: 4 or more times a week     Drinks per session: 1 or 2     Binge frequency: Never     Family History   Problem Relation Age of Onset     Eye Disorder Mother         glaucoma     Prostate Cancer Father          No current outpatient medications on file.     Allergies   Allergen Reactions     Augmentin Nausea     Sulfa Drugs      Eye drops-redness     Recent Labs   Lab Test 11/16/17  1140 10/11/16  1025  "01/19/15  1434 01/23/13  0833   * 78 99 104   HDL 80 84 66 62   TRIG 60 58 56 83   ALT 16 11  --   --    CR 0.99 1.04 0.89 1.08   GFRESTIMATED 80 76 87 70   GFRESTBLACK  --   --  >90   GFR Calc   85   POTASSIUM 5.0 4.0 4.0 4.5        Reviewed and updated as needed this visit by clinical staff  Tobacco  Allergies  Meds         Reviewed and updated as needed this visit by Provider        Past Medical History:   Diagnosis Date     NO ACTIVE PROBLEMS       Past Surgical History:   Procedure Laterality Date     CATARACT IOL, RT/LT Left      cornea surgery Left      ROTATOR CUFF REPAIR RT/LT Right 2005     TONSILLECTOMY & ADENOIDECTOMY         ROS:  CONSTITUTIONAL: NEGATIVE for fever, chills, change in weight  INTEGUMENTARY/SKIN: NEGATIVE for worrisome rashes, moles or lesions  EYES: NEGATIVE for vision changes or irritation  ENT: NEGATIVE for ear, mouth and throat problems  RESP: NEGATIVE for significant cough or SOB  CV: NEGATIVE for chest pain, palpitations or peripheral edema  GI: NEGATIVE for nausea, abdominal pain, heartburn, or change in bowel habits   male: negative for dysuria, hematuria, decreased urinary stream, erectile dysfunction, urethral discharge  MUSCULOSKELETAL: NEGATIVE for significant arthralgias or myalgia  NEURO: NEGATIVE for weakness, dizziness or paresthesias  ENDOCRINE: NEGATIVE for temperature intolerance, skin/hair changes  PSYCHIATRIC: NEGATIVE for changes in mood or affect    OBJECTIVE:   /72 (BP Location: Left arm, Patient Position: Chair, Cuff Size: Adult Regular)   Pulse 68   Temp 98.5  F (36.9  C) (Oral)   Resp 20   Ht 1.695 m (5' 6.75\")   Wt 84.6 kg (186 lb 6.4 oz)   BMI 29.41 kg/m    EXAM:  GENERAL: healthy, alert and no distress  EYES: Eyes grossly normal to inspection, PERRL and conjunctivae and sclerae normal  HENT: ear canals and TM's normal, nose and mouth without ulcers or lesions  NECK: no adenopathy, no asymmetry, masses, or scars and " "thyroid normal to palpation  RESP: lungs clear to auscultation - no rales, rhonchi or wheezes  CV: regular rate and rhythm, normal S1 S2, no S3 or S4, no murmur, click or rub, no peripheral edema and peripheral pulses strong  ABDOMEN: soft, nontender, no hepatosplenomegaly, no masses and bowel sounds normal  MS: no gross musculoskeletal defects noted, no edema  SKIN: no suspicious lesions or rashes  NEURO: Normal strength and tone, mentation intact and speech normal  PSYCH: mentation appears normal, affect normal/bright  LYMPH: no cervical, supraclavicular, axillary, or inguinal adenopathy    Diagnostic Test Results:  Labs reviewed in Epic    ASSESSMENT/PLAN:   (Z00.00) Routine general medical examination at a health care facility  (primary encounter diagnosis)  Comment: discussed preventitive healthcare   Plan: Continue to work on healthy diet and exercise, discussed healthy habits     (C61) Malignant neoplasm of prostate (H)  Comment: doing well  Plan: urology, psa checks    (Z87.891) Personal history of tobacco use, presenting hazards to health  Comment: using patch  Plan:     (R73.09) Other abnormal glucose  Comment: .  Plan: Lipid Panel ([facility]), Comprehensive Metobolic         Panel ([facility]), VENOUS COLLECTION        Continue to work on healthy diet and exercise, discussed healthy habits     (E78.5) Hyperlipidemia LDL goal <100  Comment: control uncertain  Plan: Lipid Panel ([facility]), Comprehensive Metobolic         Panel ([facility]), VENOUS COLLECTION        Continue to work on healthy diet and exercise, discussed healthy habits       COUNSELING:  Reviewed preventive health counseling, as reflected in patient instructions       Regular exercise       Healthy diet/nutrition       Vision screening       Hearing screening       Colon cancer screening       Prostate cancer screening    Estimated body mass index is 29.41 kg/m  as calculated from the following:    Height as of this encounter: 1.695 m (5' 6.75\").    Weight as of " this encounter: 84.6 kg (186 lb 6.4 oz).    Weight management plan: Discussed healthy diet and exercise guidelines     reports that he has quit smoking. His smoking use included cigarettes. He has a 35.00 pack-year smoking history. He has never used smokeless tobacco.  Tobacco Cessation Action Plan: using patch currently    Counseling Resources:  ATP IV Guidelines  Pooled Cohorts Equation Calculator  FRAX Risk Assessment  ICSI Preventive Guidelines  Dietary Guidelines for Americans, 2010  USDA's MyPlate  ASA Prophylaxis  Lung CA Screening    Americo Brown MD  [facility]

## 2019-12-27 DIAGNOSIS — Z85.46 PERSONAL HISTORY OF MALIGNANT NEOPLASM OF PROSTATE: Primary | ICD-10-CM

## 2019-12-27 PROCEDURE — 36415 COLL VENOUS BLD VENIPUNCTURE: CPT | Performed by: FAMILY MEDICINE

## 2019-12-27 NOTE — NURSING NOTE
pt here for lab only        [facility]  Phone [phone]  Dr. Marcos Riggins  Fax [phone]      Pt notified of Urology to send a new order today  Bette

## 2019-12-28 LAB — ABBOTT PSA - QUEST: <0.1 NG/ML

## 2020-01-06 ENCOUNTER — TRANSFERRED RECORDS (OUTPATIENT)
Dept: FAMILY MEDICINE | Facility: CLINIC | Age: 65
End: 2020-01-06

## 2020-04-03 ENCOUNTER — TELEPHONE (OUTPATIENT)
Dept: FAMILY MEDICINE | Facility: CLINIC | Age: 65
End: 2020-04-03

## 2020-04-03 DIAGNOSIS — C61 MALIGNANT NEOPLASM OF PROSTATE (H): Primary | ICD-10-CM

## 2020-04-03 NOTE — TELEPHONE ENCOUNTER
Alejo would like to come in for his PSA blood work so he would like to schedule but I need orders please    Patient's phone #[phone]

## 2020-04-06 DIAGNOSIS — C61 MALIGNANT NEOPLASM OF PROSTATE (H): ICD-10-CM

## 2020-04-06 PROCEDURE — 36415 COLL VENOUS BLD VENIPUNCTURE: CPT | Performed by: FAMILY MEDICINE

## 2020-04-07 LAB — ABBOTT PSA - QUEST: <0.1 NG/ML

## 2020-07-15 DIAGNOSIS — C61 MALIGNANT NEOPLASM OF PROSTATE (H): Primary | ICD-10-CM

## 2020-07-15 PROCEDURE — 36415 COLL VENOUS BLD VENIPUNCTURE: CPT | Performed by: FAMILY MEDICINE

## 2020-07-16 ENCOUNTER — TRANSFERRED RECORDS (OUTPATIENT)
Dept: FAMILY MEDICINE | Facility: CLINIC | Age: 65
End: 2020-07-16

## 2020-07-16 LAB — ABBOTT PSA - QUEST: <0.1 NG/ML

## 2020-09-24 ENCOUNTER — ALLIED HEALTH/NURSE VISIT (OUTPATIENT)
Dept: FAMILY MEDICINE | Facility: CLINIC | Age: 65
End: 2020-09-24

## 2020-09-24 DIAGNOSIS — Z23 NEED FOR VACCINATION: Primary | ICD-10-CM

## 2020-09-24 PROCEDURE — G0008 ADMIN INFLUENZA VIRUS VAC: HCPCS | Performed by: FAMILY MEDICINE

## 2020-09-24 PROCEDURE — 90686 IIV4 VACC NO PRSV 0.5 ML IM: CPT | Performed by: FAMILY MEDICINE

## 2020-10-07 ENCOUNTER — MYC MEDICAL ADVICE (OUTPATIENT)
Dept: FAMILY MEDICINE | Facility: CLINIC | Age: 65
End: 2020-10-07

## 2020-10-08 ENCOUNTER — OFFICE VISIT (OUTPATIENT)
Dept: FAMILY MEDICINE | Facility: CLINIC | Age: 65
End: 2020-10-08

## 2020-10-08 VITALS
HEIGHT: 67 IN | HEART RATE: 66 BPM | SYSTOLIC BLOOD PRESSURE: 124 MMHG | WEIGHT: 196.4 LBS | OXYGEN SATURATION: 98 % | TEMPERATURE: 97.6 F | DIASTOLIC BLOOD PRESSURE: 76 MMHG | BODY MASS INDEX: 30.83 KG/M2

## 2020-10-08 DIAGNOSIS — S16.1XXA STRAIN OF NECK MUSCLE, INITIAL ENCOUNTER: Primary | ICD-10-CM

## 2020-10-08 PROCEDURE — 99213 OFFICE O/P EST LOW 20 MIN: CPT | Performed by: FAMILY MEDICINE

## 2020-10-08 ASSESSMENT — MIFFLIN-ST. JEOR: SCORE: 1628.45

## 2020-10-08 NOTE — PROGRESS NOTES
SUBJECTIVE:   Alejo Antunez is a 65 year old male who complains of neck stiffness and pain for 3 months, left posterior, NKI. The pain is positional with movement of neck without radiation of pain down the arms. Mechanism of injury: NKI.  Symptoms have been unchanged since that time. Prior history of neck problems: no prior neck problems. There is no numbness, tingling, weakness in the arms.    Ibuprofen helps some, ice helps a little    It does bother him when sleeping    Patient Active Problem List   Diagnosis     CARDIOVASCULAR SCREENING; LDL GOAL LESS THAN 160     Traumatic cataract of eye     Tobacco use disorder     Family history of prostate cancer     Health Care Home     Fear of flying     ACP (advance care planning)     Other abnormal glucose     Personal history of tobacco use, presenting hazards to health     Psoriasis annularis     Malignant neoplasm of prostate (H)     Past Medical History:   Diagnosis Date     NO ACTIVE PROBLEMS      Family History   Problem Relation Age of Onset     Eye Disorder Mother         glaucoma     Prostate Cancer Father      Social History     Socioeconomic History     Marital status:      Spouse name: Not on file     Number of children: Not on file     Years of education: Not on file     Highest education level: Not on file   Occupational History     Comment:  school system    Social Needs     Financial resource strain: Not on file     Food insecurity     Worry: Not on file     Inability: Not on file     Transportation needs     Medical: Not on file     Non-medical: Not on file   Tobacco Use     Smoking status: Former Smoker     Packs/day: 1.00     Years: 35.00     Pack years: 35.00     Types: Cigarettes     Smokeless tobacco: Never Used   Substance and Sexual Activity     Alcohol use: Yes     Alcohol/week: 10.0 standard drinks     Types: 10 Standard drinks or equivalent per week     Frequency: 4 or more times a week     Drinks per session: 1 or 2      Binge frequency: Never     Drug use: No     Sexual activity: Yes     Partners: Female   Lifestyle     Physical activity     Days per week: Not on file     Minutes per session: Not on file     Stress: Not on file   Relationships     Social connections     Talks on phone: Not on file     Gets together: Not on file     Attends Restoration service: Not on file     Active member of club or organization: Not on file     Attends meetings of clubs or organizations: Not on file     Relationship status: Not on file     Intimate partner violence     Fear of current or ex partner: Not on file     Emotionally abused: Not on file     Physically abused: Not on file     Forced sexual activity: Not on file   Other Topics Concern     Parent/sibling w/ CABG, MI or angioplasty before 65F 55M? No      Service No     Blood Transfusions No     Caffeine Concern No     Occupational Exposure No     Hobby Hazards No     Sleep Concern No     Stress Concern No     Weight Concern No     Special Diet No     Back Care No     Exercise Not Asked     Bike Helmet Not Asked     Seat Belt Yes     Self-Exams Not Asked   Social History Narrative     Not on file     Past Surgical History:   Procedure Laterality Date     CATARACT IOL, RT/LT Left      cornea surgery Left      ROTATOR CUFF REPAIR RT/LT Right 2005     TONSILLECTOMY & ADENOIDECTOMY       No current outpatient medications on file prior to visit.  No current facility-administered medications on file prior to visit.        Allergies: Augmentin and Sulfa drugs    Immunization History   Administered Date(s) Administered     Influenza Vaccine IM > 6 months Valent IIV4 11/19/2015, 10/11/2016, 10/19/2017, 10/25/2018, 11/14/2019, 09/24/2020     Pneumo Conj 13-V (2010&after) 10/02/2018     Pneumococcal 23 valent 12/04/2019     TD (ADULT, 7+) 02/07/2005     TDAP Vaccine (Boostrix) 02/05/2015     Zoster vaccine recombinant adjuvanted (SHINGRIX) 08/22/2019, 11/19/2019     Zoster vaccine, live  01/30/2015        OBJECTIVE:  Vital signs as noted above. Decreased extension and left rotation ROM, Patient appears to be in mild to moderate pain.   Neck exam: tenderness over lower cervical spine and nuchal area, normal neurological exam of arms; normal DTR's, motor, sensory exam. Neg   X-Ray: not indicated.at this time, may need MRI if does not respond to PT    ASSESSMENT:   cervical strain, stiffness, decreased ROM    PLAN:  referral to physical therapy  ConsiderXRay studies if not improving.   Call or return to clinic prn if these symptoms worsen or fail to improve as anticipated.

## 2020-10-08 NOTE — NURSING NOTE
Alejo is here for a stiff neck and referral to see a therapist.    Pre-Visit Screening:  Immunizations:UTD  Colonoscopy:UTD  Mammogram:NA  Asthma Action Test/Plan:NA  PHQ9:NA  GAD7:NA  Questioned patient about current smoking habits Pt.former smoker  OK to leave a detailed message on voice mail for today's visit yes, phone # [phone]

## 2021-01-16 ENCOUNTER — HEALTH MAINTENANCE LETTER (OUTPATIENT)
Age: 66
End: 2021-01-16

## 2021-02-05 DIAGNOSIS — Z85.46 PERSONAL HISTORY OF MALIGNANT NEOPLASM OF PROSTATE: Primary | ICD-10-CM

## 2021-02-05 PROCEDURE — 36415 COLL VENOUS BLD VENIPUNCTURE: CPT | Performed by: FAMILY MEDICINE

## 2021-02-05 NOTE — NURSING NOTE
[facility]   [facility]  [phone]  Fax [phone]  Dr. Marcos Riggins

## 2021-02-06 LAB — ABBOTT PSA - QUEST: <0.1 NG/ML

## 2021-05-08 ENCOUNTER — HEALTH MAINTENANCE LETTER (OUTPATIENT)
Age: 66
End: 2021-05-08

## 2021-06-04 ENCOUNTER — OFFICE VISIT (OUTPATIENT)
Dept: FAMILY MEDICINE | Facility: CLINIC | Age: 66
End: 2021-06-04

## 2021-06-04 VITALS
BODY MASS INDEX: 30.45 KG/M2 | TEMPERATURE: 98.6 F | OXYGEN SATURATION: 97 % | HEIGHT: 67 IN | DIASTOLIC BLOOD PRESSURE: 68 MMHG | SYSTOLIC BLOOD PRESSURE: 120 MMHG | HEART RATE: 80 BPM | WEIGHT: 194 LBS

## 2021-06-04 DIAGNOSIS — J01.00 ACUTE NON-RECURRENT MAXILLARY SINUSITIS: Primary | ICD-10-CM

## 2021-06-04 PROCEDURE — 99213 OFFICE O/P EST LOW 20 MIN: CPT | Performed by: PHYSICIAN ASSISTANT

## 2021-06-04 RX ORDER — IBUPROFEN 200 MG
200 TABLET ORAL EVERY 4 HOURS PRN
COMMUNITY
End: 2022-07-07

## 2021-06-04 RX ORDER — AZITHROMYCIN 250 MG/1
TABLET, FILM COATED ORAL
Qty: 6 TABLET | Refills: 0 | Status: SHIPPED | OUTPATIENT
Start: 2021-06-04 | End: 2022-07-07

## 2021-06-04 ASSESSMENT — MIFFLIN-ST. JEOR: SCORE: 1608.61

## 2021-06-04 NOTE — PROGRESS NOTES
"CC: Bronchitis, sinus?    History:  4-5 days ago, on Monday night, Alejo felt irritated throat, nasal congestion. Woke up the night day with a cough, that seems to be getting deeper, with more phlegm mainly in the morning. No blood in cough. No shortness of breath, fever. Does feel some sinus pressure over cheeks, forehead. No teeth pain. Has been taking Mucinex DM, cough drops, which has given him some relief. Has nasal rinse at home, but hasn't use it.     No history of seasonal allergies, but was around granddaughter this past weekend that had a cold. She had a negative rapid Covid-19 test before they were together. Does have a smoking history, but quit 20 months ago.     PMH, MEDICATIONS, ALLERGIES, SOCIAL AND FAMILY HISTORY in [facility] and reviewed by me personally.    ROS negative other than the symptoms noted above in the HPI.      Examination   /68 (BP Location: Left arm, Patient Position: Sitting, Cuff Size: Adult Large)   Pulse 80   Temp 98.6  F (37  C) (Temporal)   Ht 1.702 m (5' 7\")   Wt 88 kg (194 lb)   SpO2 97%   BMI 30.38 kg/m       Constitutional: Sitting comfortably, in no acute distress. Vital signs noted  Eyes: pupils equal round reactive to light and accomodation, extra ocular movements intact  Ears: external canals and TMs free of abnormalities  Nose: patent, without mucosal abnormalities  Mouth and throat: without erythema or lesions of the mucosa  Neck:  no adenopathy, trachea midline and normal to palpation, thyroid normal to palpation  Cardiovascular:  regular rate and rhythm, no murmurs, clicks, or gallops  Respiratory:  normal respiratory rate and rhythm, lungs clear to auscultation other than diffuse rhonchi.  SKIN: No jaundice/pallor/rash.   Psychiatric: mentation appears normal and affect normal/bright      A/P    ICD-10-CM    1. Acute non-recurrent maxillary sinusitis  J01.00 azithromycin (ZITHROMAX) 250 MG tablet       DISCUSSION:  Consistent with sinus infection. Suspect " rhonchi on exam are due to bronchial sounds from sinus drainage. This infection is likely still viral given relatively short duration. Recommended continue Mucinex DM. Also consider applying hot wash cloth to face to sooth sinuses. Finally, could consider nasal rinses 1-2 times daily (ex. NetiPot, using distilled water, with sterile salt packet). Given that pt is leaving town on Monday, amoxicillin allergy, agreed to send through prescription for z-pack to start only if symptoms have been ongoing for at least 7 days with no sign of improvement. Pt understands risk of taking antibiotics unnecessarily, and agrees to wait until this timeline to take. Contact me with any concerns.     follow up visit: As needed    Jody Ching PA-C  [facility]

## 2021-06-04 NOTE — NURSING NOTE
Alejo is here for cough and sinus congestion start four days ago. No other symptoms. He has not had environmental allergies in the past.      Pre-visit Screening:  Immunizations:  up to date  Colonoscopy:  is up to date  Mammogram: NA  Asthma Action Test/Plan:  NA  PHQ9:  NA  GAD7:  NA  Questioned patient about current smoking habits Pt. has never smoked.  Ok to leave detailed message on voice mail for today's visit only Yes, phone # [phone]

## 2021-06-18 NOTE — PROGRESS NOTES
Pharmacy Note - Admission Medication History  Pertinent Provider Information: None   ______________________________________________________________________  Prior To Admission (PTA) med list completed and updated in EMR.     PTA Med List   Medication Sig Last Dose     ALPRAZolam (XANAX) 0.5 MG tablet Take 0.5 mg by mouth 3 (three) times a day as needed for anxiety (Only when flying). Unknown     diphenhydrAMINE (BENADRYL) 50 MG tablet Take 50 mg by mouth at bedtime as needed for sleep.  6/18/2018     finasteride (PROSCAR) 5 mg tablet Take 5 mg by mouth daily. 6/18/2018     ibuprofen (ADVIL,MOTRIN) 200 MG tablet Take 400-600 mg by mouth every 6 (six) hours as needed for pain.  6/12/2018     tacrolimus (PROTOPIC) 0.1 % ointment Apply 1 application topically 2 (two) times a day as needed (Psoriasis). 6/18/2018       Information source(s): Patient, Clinic records and CareEverywhere/SureScripts  Patient was asked about OTC/herbal products specifically.  PTA med list reflects this.  Based on the pharmacist s assessment, the PTA med list information appears reliable  Allergies were reviewed, assessed, and updated with the patient.    Patient does not anticipate needing any multi-use medications during admission.   Thank you for the opportunity to participate in the care of this patient.    Italia David, PharmD     6/19/2018     7:50 AM

## 2021-06-18 NOTE — OP NOTE
Operative Note    Name:  Alejo Antunez  PCP:  Americo Brown MD  Procedure Date:  6/19/2018   Location: [facility] OR        ROBOTIC RADICAL RETROPUBIC PROSTATECTOMY WITH BILATERAL PELVIC LYMPH NODE DISSECTION  (Bilateral)    Pre-Procedure Diagnosis:  Prostate cancer [C61]     Post-Procedure Diagnosis:    Prostate cancer    Surgeon(s):  MD David Ford MD    No Physician Assistant or First Assist has been documented in procedure        Anesthesia Type:  General    Past Medical History:   Diagnosis Date     Arthritis      Kidney stones      Prostate cancer (H)        There are no active problems to display for this patient.      Findings:  Bilateral nerve spare.       Operative Report:      The bladder neck was then identified and scored down to the espinoza catheter.  The balloon was deflated.  The posterior bladder was identified and scored down to the seminal vesicles.  The vascular pedicles were progressively isolated and clipped with hemoclips.  Care was used not to use any cautery near the nerves.      Using the 0 lens the apex was dissected of down to the urethra.  The urethra was transected.  A margin was taken at urethra and bladder neck.  The prostate was bagged along with the Ray-Sammy sponge.    The bladder neck was reconstructed using 2-0 vicryl.  The anastomosis was completed between the bladder and the urethra using 3-0 Monocryl.  The espinoza was placed, irrigated and noted to be watertight.  Sponge and needle count was correct.    The robot was undocked.  The specimen bag was brought to the midline port.  The trochars were removed.  The specimen was delivered.  The ports were infiltrated with Marcaine.  The fascia was closed in the midline port with interrupted #1 Vicryl.  All skin incisions were closed with 400 Vicryl.  Patient sent to recovery room in stable condition.    Please see Dr. Riggins's dictation for the first portion of the procedure.    Estimated  Blood Loss:   55 mL from 6/19/2018  8:57 AM to 6/19/2018 11:30 AM    Specimens:      ID Type Source Tests Collected by Time Destination   A : Right Tissue Lymph Node(s), Obturator, Right SURGICAL PATHOLOGY EXAM Marcos Riggins MD 6/19/2018 1006    B : Left Tissue Lymph Node(s), Obturator, Left SURGICAL PATHOLOGY EXAM Marcos Riggins MD 6/19/2018 1008    C : Urethral Biopsy Tissue Urethra SURGICAL PATHOLOGY EXAM Marcos Riggins MD 6/19/2018 1046    D : Bladder Neck Biopsy Tissue Urinary Bladder, Neck Margin SURGICAL PATHOLOGY EXAM Marcos Riggins MD 6/19/2018 1047    E :  Tissue Prostate SURGICAL PATHOLOGY EXAM Marcos Riggins MD 6/19/2018 1122           Drains:   Urethral Catheter Latex;Double-lumen 18 Fr. (Active)       Complications:    None    David Boateng     Date: 6/19/2018  Time: 11:30 AM

## 2021-06-18 NOTE — OP NOTE
Operative Note     Name:  Alejo Antunez  PCP:  Americo Brown MD  Procedure Date:  6/19/2018  Location: [facility] OR      ROBOTIC RADICAL RETROPUBIC PROSTATECTOMY WITH BILATERAL PELVIC LYMPH NODE DISSECTION  (Bilateral)    Pre-Procedure Diagnosis:  Prostate cancer [C61]     Post-Procedure Diagnosis:    Prostate Cancer    Surgeon(s):  MD David Ford MD    No Physician Assistant or First Assist has been documented in procedure    Anesthesia Type:  General    Past Medical History:   Diagnosis Date     Arthritis      Kidney stones      Prostate cancer (H)        There are no active problems to display for this patient.      Findings:  Prostate cancer      Operative Report:      The patient underwent induction of general anesthesia was prepped and draped in the dorsal lithotomy position under sterile conditions.  Back cath was placed in the table.  A varies needle was placed through a stab incision below the umbilicus and the abdomen was insufflated to 15 mmHg.  A 10/12 port was used under direct vision to look into the abdomen.  Under direct vision two, 8 mm ports were placed on the left, an 8 mm 10/12 and a 5 in the right.  The abdomen was inspected.    The instruments were placed.  The sigmoid colon was reflected medially.  The posterior peritoneum behind the bladder was incised and the right left vas deferens were identified.  A small portion of vas was excised and removed bilaterally.  Using the 30-up Lens the plane was developed between the rectum and the prostate to the apex of the prostate.    The seminal vesicles were then then dissected free.  A pack was placed in the pelvis.    The bladder was sharply and bluntly taken down to the endopelvic fascia.  A bilateral pelvic lymph node dissection was performed.  The obturator nerve was visualized and spared bilaterally.  Clips were used to control the lymphatics.    The endopelvic fascia was then incised and the  puboprostatic ligaments were ligated.  A dorsal venous complex stitch ×2 with a suspension suture to the pubic bone was placed.    Using the 30 down lens the lateral portion of the prostate was identified and the neurovascular bundles were dissected off from the apex to the  midportion of the prostate.    Estimated Blood Loss:   55 mL from 6/19/2018  8:57 AM to 6/19/2018 11:31 AM    Specimens:      ID Type Source Tests Collected by Time Destination   A : Right Tissue Lymph Node(s), Obturator, Right SURGICAL PATHOLOGY EXAM Marcos Riggins MD 6/19/2018 1006    B : Left Tissue Lymph Node(s), Obturator, Left SURGICAL PATHOLOGY EXAM Marcos Riggins MD 6/19/2018 1008    C : Urethral Biopsy Tissue Urethra SURGICAL PATHOLOGY EXAM Marcos Riggins MD 6/19/2018 1046    D : Bladder Neck Biopsy Tissue Urinary Bladder, Neck Margin SURGICAL PATHOLOGY EXAM Marcos Riggins MD 6/19/2018 1047    E :  Tissue Prostate SURGICAL PATHOLOGY EXAM Marcos Riggins MD 6/19/2018 1122           Drains:   Urethral Catheter Latex;Double-lumen 18 Fr. (Active)       Complications:    None    Marcos Riggins     Date: 6/19/2018  Time: 11:31 AM

## 2021-06-18 NOTE — DISCHARGE SUMMARY
Physician Discharge Summary    Primary Care Physician:  Americo Brown MD    Discharge Provider: Marcos Riggins      Admission Date: 6/19/2018. Admission Diagnoses: Prostate cancer (H) [C61]   Discharge Date and Time: No discharge date for patient encounter.   Disposition: Final discharge disposition not confirmed  Condition at Discharge: Stable  Code Status: Full Code     Principal Diagnosis:  <principal problem not specified>     Discharge Diagnoses:  Active Problems:    * No active hospital problems. *      Surgery:  Procedure(s):  ROBOTIC RADICAL RETROPUBIC PROSTATECTOMY WITH BILATERAL PELVIC LYMPH NODE DISSECTION     Hospital Summary:   The patient is a 63 y.o. individual with a history of cap. They underwent the above-mentioned procedure and tolerated it well without complications. They were admitted to the hospital for postoperative care. Their hospital stay was uneventful and the patient did well overall.  At time of discharge, the patient was tolerating a diet, ambulating, and pain well controlled with oral pain medications.    Vital Signs in last 24 hours:    Temp:  [97.2  F (36.2  C)-99.4  F (37.4  C)] 99.4  F (37.4  C)  Heart Rate:  [68-90] 74  Resp:  [11-24] 16  BP: (104-176)/(55-86) 105/59    Physical Exam:    General: Awake alert NAD  Resp: respirations unlabored  Abd: soft, nontender to palpation        Discharge Medications:      Medication List      START taking these medications          cephalexin 500 MG capsule   Quantity:  20 capsule   Dose:  500 mg   Commonly known as:  KEFLEX   500 mg, Oral, BID       oxyCODONE-acetaminophen 5-325 mg per tablet   Quantity:  30 tablet   Dose:  1 tablet   Commonly known as:  PERCOCET   1 tablet, Oral, Q4H PRN       polyethylene glycol 17 gram packet   Quantity:  14 each   Dose:  17 g   Commonly known as:  MIRALAX   17 g, Oral, DAILY         CONTINUE taking these medications          ALPRAZolam 0.5 MG tablet   Dose:  0.5 mg   Commonly known as:   XANAX   0.5 mg, Oral, TID PRN       diphenhydrAMINE 50 MG tablet   Dose:  50 mg   Commonly known as:  BENADRYL   50 mg, Oral, Bedtime PRN       ibuprofen 200 MG tablet   Dose:  400-600 mg   Commonly known as:  ADVIL,MOTRIN   400-600 mg, Oral, Q6H PRN       tacrolimus 0.1 % ointment   Dose:  1 application   Commonly known as:  PROTOPIC   1 application, Topical, BID PRN         STOP taking these medications          finasteride 5 mg tablet   Commonly known as:  PROSCAR              Discharge Instructions:  Follow up appointment with surgeon Marcos Riggins  Activity: no heavy lifting  Restrictions: no driving for 1 week  Wound / drain care:keep wound clean and dry. Back catheter handout provided

## 2021-06-24 VITALS
HEIGHT: 67 IN | WEIGHT: 193 LBS | BODY MASS INDEX: 30.29 KG/M2 | BODY MASS INDEX: 30.29 KG/M2 | WEIGHT: 193 LBS | HEIGHT: 67 IN

## 2021-06-24 VITALS — HEIGHT: 68 IN | BODY MASS INDEX: 29.4 KG/M2 | WEIGHT: 194 LBS

## 2021-07-12 ENCOUNTER — ALLIED HEALTH/NURSE VISIT (OUTPATIENT)
Dept: FAMILY MEDICINE | Facility: CLINIC | Age: 66
End: 2021-07-12

## 2021-07-12 DIAGNOSIS — Z85.46 PERSONAL HISTORY OF MALIGNANT NEOPLASM OF PROSTATE: Primary | ICD-10-CM

## 2021-07-12 PROCEDURE — 36415 COLL VENOUS BLD VENIPUNCTURE: CPT | Performed by: FAMILY MEDICINE

## 2021-07-12 NOTE — NURSING NOTE
Pt here for lab only blood draw, has orders from [facility].  Will fax labs when resulted.  Had to temporarily switch visit to an immunization due to Epic upgrade not allowing us to use lab only.   Pt was told to bring new order, order expires this month.

## 2021-07-13 LAB — ABBOTT PSA - QUEST: <0.1 NG/ML

## 2021-09-16 ENCOUNTER — MYC MEDICAL ADVICE (OUTPATIENT)
Dept: FAMILY MEDICINE | Facility: CLINIC | Age: 66
End: 2021-09-16

## 2021-10-23 ENCOUNTER — HEALTH MAINTENANCE LETTER (OUTPATIENT)
Age: 66
End: 2021-10-23

## 2021-10-29 ENCOUNTER — ALLIED HEALTH/NURSE VISIT (OUTPATIENT)
Dept: FAMILY MEDICINE | Facility: CLINIC | Age: 66
End: 2021-10-29

## 2021-10-29 DIAGNOSIS — Z23 NEED FOR VACCINATION: Primary | ICD-10-CM

## 2021-10-29 PROCEDURE — G0008 ADMIN INFLUENZA VIRUS VAC: HCPCS | Performed by: FAMILY MEDICINE

## 2021-10-29 PROCEDURE — 90686 IIV4 VACC NO PRSV 0.5 ML IM: CPT | Performed by: FAMILY MEDICINE

## 2021-12-06 DIAGNOSIS — Z85.46 PERSONAL HISTORY OF MALIGNANT NEOPLASM OF PROSTATE: Primary | ICD-10-CM

## 2021-12-06 PROCEDURE — 36415 COLL VENOUS BLD VENIPUNCTURE: CPT | Performed by: FAMILY MEDICINE

## 2021-12-07 LAB — ABBOTT PSA - QUEST: <0.04 NG/ML

## 2022-02-12 ENCOUNTER — HEALTH MAINTENANCE LETTER (OUTPATIENT)
Age: 67
End: 2022-02-12

## 2022-03-30 ENCOUNTER — TRANSFERRED RECORDS (OUTPATIENT)
Dept: FAMILY MEDICINE | Facility: CLINIC | Age: 67
End: 2022-03-30

## 2022-06-21 ENCOUNTER — MYC MEDICAL ADVICE (OUTPATIENT)
Dept: FAMILY MEDICINE | Facility: CLINIC | Age: 67
End: 2022-06-21

## 2022-06-21 DIAGNOSIS — U07.1 INFECTION DUE TO 2019 NOVEL CORONAVIRUS: Primary | ICD-10-CM

## 2022-06-21 RX ORDER — IBUPROFEN 800 MG/1
800 TABLET, FILM COATED ORAL EVERY 8 HOURS PRN
Qty: 30 TABLET | Refills: 0 | Status: SHIPPED | OUTPATIENT
Start: 2022-06-21 | End: 2023-11-06

## 2022-06-23 ENCOUNTER — MYC MEDICAL ADVICE (OUTPATIENT)
Dept: FAMILY MEDICINE | Facility: CLINIC | Age: 67
End: 2022-06-23

## 2022-07-07 ENCOUNTER — OFFICE VISIT (OUTPATIENT)
Dept: FAMILY MEDICINE | Facility: CLINIC | Age: 67
End: 2022-07-07

## 2022-07-07 VITALS
HEIGHT: 67 IN | DIASTOLIC BLOOD PRESSURE: 82 MMHG | WEIGHT: 196 LBS | TEMPERATURE: 97.8 F | OXYGEN SATURATION: 98 % | BODY MASS INDEX: 30.76 KG/M2 | HEART RATE: 77 BPM | SYSTOLIC BLOOD PRESSURE: 132 MMHG

## 2022-07-07 DIAGNOSIS — Z85.46 PERSONAL HISTORY OF MALIGNANT NEOPLASM OF PROSTATE: Primary | ICD-10-CM

## 2022-07-07 PROCEDURE — 99213 OFFICE O/P EST LOW 20 MIN: CPT | Performed by: FAMILY MEDICINE

## 2022-07-07 NOTE — NURSING NOTE
Chief Complaint   Patient presents with     RECHECK     Recheck PSA      Pre-visit Screening:  Immunizations:  up to date  Colonoscopy:  is up to date  Mammogram: NA  Asthma Action Test/Plan:  NA  PHQ9:  NA  GAD7:  NA  Questioned patient about current smoking habits Pt. quit smoking some time ago.  Ok to leave detailed message on voice mail for today's visit only Yes, phone # [phone]

## 2022-07-07 NOTE — PROGRESS NOTES
"  Assessment & Plan     Personal history of malignant neoplasm of prostate  Doing well, check PSA every 6 mo for 3 more than yearly if OK  - PSA Total ([facility])  - PSA Total ([facility])      Ordering of each unique test         BMI:   Estimated body mass index is 30.7 kg/m  as calculated from the following:    Height as of this encounter: 1.702 m (5' 7\").    Weight as of this encounter: 88.9 kg (196 lb).       Regular exercise    No follow-ups on file.    Americo Brown MD  [facility]    Subjective   Alejo is a 67 year old, presenting for the following health issues:  RECHECK (Recheck PSA )      HPI     Prostate CA-followed by urology, had surgery 2018- needs PSA every 6 months for 3 more    How many servings of fruits and vegetables do you eat daily?  2-3    On average, how many sweetened beverages do you drink each day (Examples: soda, juice, sweet tea, etc.  Do NOT count diet or artificially sweetened beverages)?   1    How many days per week do you exercise enough to make your heart beat faster? 4    How many minutes a day do you exercise enough to make your heart beat faster? 60 or more    How many days per week do you miss taking your medication? 0        Review of Systems   Constitutional, HEENT, cardiovascular, pulmonary, gi and gu systems are negative, except as otherwise noted.      Objective    /82 (BP Location: Left arm, Patient Position: Sitting, Cuff Size: Adult Regular)   Pulse 77   Temp 97.8  F (36.6  C) (Temporal)   Ht 1.702 m (5' 7\")   Wt 88.9 kg (196 lb)   SpO2 98%   BMI 30.70 kg/m    Body mass index is 30.7 kg/m .  Physical Exam   GENERAL: healthy, alert and no distress  NECK: no adenopathy, no asymmetry, masses, or scars and thyroid normal to palpation  RESP: lungs clear to auscultation - no rales, rhonchi or wheezes  CV: regular rate and rhythm, normal S1 S2, no S3 or S4, no murmur, click or rub, no peripheral edema and peripheral pulses strong  ABDOMEN: soft, " nontender, no hepatosplenomegaly, no masses and bowel sounds normal  MS: no gross musculoskeletal defects noted, no edema  PSYCH: mentation appears normal, affect normal/bright    Orders Only on 12/06/2021   Component Date Value Ref Range Status     Abbott PSA 12/06/2021 <0.04  < OR = 4.00 ng/mL Final    Comment: The total PSA value from this assay system is   standardized against the WHO standard. The test   result will be approximately 20% lower when compared   to the equimolar-standardized total PSA (Clarence   Flako). Comparison of serial PSA results should be   interpreted with this fact in mind.     This test was performed using the Siemens   chemiluminescent method. Values obtained from   different assay methods cannot be used  interchangeably. PSA levels, regardless of  value, should not be interpreted as absolute  evidence of the presence or absence of disease.                       .  ..

## 2022-07-08 LAB — ABBOTT PSA - QUEST: <0.04 NG/ML

## 2022-07-11 ENCOUNTER — TELEPHONE (OUTPATIENT)
Dept: FAMILY MEDICINE | Facility: CLINIC | Age: 67
End: 2022-07-11

## 2022-07-11 NOTE — TELEPHONE ENCOUNTER
Fax from [facility] asking that we fax office note(s),  labs, medication list, PSA for the past year to     [facility]   [phone] -- fax     For patients 7/12/2022 yearly follow up appt. Information has been faxed

## 2022-07-12 ENCOUNTER — TRANSFERRED RECORDS (OUTPATIENT)
Dept: FAMILY MEDICINE | Facility: CLINIC | Age: 67
End: 2022-07-12

## 2022-08-03 ENCOUNTER — MYC MEDICAL ADVICE (OUTPATIENT)
Dept: FAMILY MEDICINE | Facility: CLINIC | Age: 67
End: 2022-08-03

## 2022-08-03 DIAGNOSIS — F40.243 FEAR OF FLYING: ICD-10-CM

## 2022-08-03 RX ORDER — ALPRAZOLAM 0.25 MG
0.25 TABLET ORAL
Qty: 6 TABLET | Refills: 0 | Status: SHIPPED | OUTPATIENT
Start: 2022-08-03

## 2022-08-03 NOTE — TELEPHONE ENCOUNTER
Pt sent [address] [employer] I message asking for Xanax for a flight to [place]. Last prescribed in 2019. Please advise.    Thanks, Kiah LUNA

## 2022-10-09 ENCOUNTER — HEALTH MAINTENANCE LETTER (OUTPATIENT)
Age: 67
End: 2022-10-09

## 2022-11-03 ENCOUNTER — ALLIED HEALTH/NURSE VISIT (OUTPATIENT)
Dept: FAMILY MEDICINE | Facility: CLINIC | Age: 67
End: 2022-11-03

## 2022-11-03 DIAGNOSIS — Z23 NEED FOR VACCINATION: Primary | ICD-10-CM

## 2022-11-03 PROCEDURE — 90662 IIV NO PRSV INCREASED AG IM: CPT | Performed by: FAMILY MEDICINE

## 2022-11-03 PROCEDURE — G0008 ADMIN INFLUENZA VIRUS VAC: HCPCS | Performed by: FAMILY MEDICINE

## 2022-12-02 NOTE — MR AVS SNAPSHOT
"              After Visit Summary   11/16/2017    Alejo Antunez    MRN: [id number]           Patient Information     Date Of Birth          1955        Visit Information        Provider Department      11/16/2017 11:00 AM Americo Brown MD [facility]        Today's Diagnoses     Routine general medical examination at a health care facility    -  1    Fear of flying        Family history of prostate cancer           Follow-ups after your visit        Who to contact     If you have questions or need follow up information about today's clinic visit or your schedule please contact [facility] directly at [phone].  Normal or non-critical lab and imaging results will be communicated to you by [employer], letter or phone within 4 business days after the clinic has received the results. If you do not hear from us within 7 days, please contact the clinic through [employer] or phone. If you have a critical or abnormal lab result, we will notify you by phone as soon as possible.  Submit refill requests through [employer] or call your pharmacy and they will forward the refill request to us. Please allow 3 business days for your refill to be completed.          Additional Information About Your Visit        MyChart Information     [employer] gives you secure access to your electronic health record. If you see a primary care provider, you can also send messages to your care team and make appointments. If you have questions, please call your primary care clinic.  If you do not have a primary care provider, please call [phone] and they will assist you.        Care EveryWhere ID     This is your Care EveryWhere ID. This could be used by other organizations to access your [place] medical records  [id number]        Your Vitals Were     Pulse Temperature Height Pulse Oximetry BMI (Body Mass Index)       75 97.8  F (36.6  C) (Oral) 1.702 m (5' 7\") 98% 29.35 kg/m2        " Pt visible in dayroom, cooperative and calm  She reports no anxiety or depression  Pt denies SI/HI and AH/VH  Pt states " I am ready to go home and sleep in my own bed " Pt denies physical pain  She reports LBM on 11/30  Blood Pressure from Last 3 Encounters:   11/16/17 112/78   05/31/17 108/68   03/29/17 110/68    Weight from Last 3 Encounters:   11/16/17 85 kg (187 lb 6.4 oz)   05/31/17 88.7 kg (195 lb 9.6 oz)   03/29/17 89.3 kg (196 lb 12.8 oz)              We Performed the Following     COMPREHENSIVE METABOLIC PANEL ([facility]) XCMP     Lipid Profile ([facility])     VENOUS COLLECTION          Today's Medication Changes          These changes are accurate as of: 11/16/17 11:41 AM.  If you have any questions, ask your nurse or doctor.               Start taking these medicines.        Dose/Directions    ALPRAZolam 0.5 MG tablet   Commonly known as:  XANAX   Used for:  Fear of flying   Started by:  Americo Brown MD        Dose:  0.5 mg   Take 1 tablet (0.5 mg) by mouth 3 times daily as needed for anxiety   Quantity:  10 tablet   Refills:  0            Where to get your medicines      Some of these will need a paper prescription and others can be bought over the counter.  Ask your nurse if you have questions.     Bring a paper prescription for each of these medications     ALPRAZolam 0.5 MG tablet                Primary Care Provider Office Phone # Fax #    Americo Brown [phone]       [address] [address]        Equal Access to Services     FERNANDO GRESHAM AH: Hadii mariely ku hadasho Soomaali, waaxda luqadaha, qaybta kaalmada adeegyada, kelly torres hayyasemin markham . So [facility] [phone].    ATENCIÓN: Si habla español, tiene a conroy disposición servicios gratuitos de asistencia lingüística. Llame al [phone].    We comply with applicable federal civil rights laws and [place] laws. We do not discriminate on the basis of race, color, national origin, age, disability, sex, sexual orientation, or gender identity.            Thank you!     Thank you for choosing [facility]  for your care. Our goal is always to provide you with excellent care.  Hearing back from our patients is one way we can continue to improve our services. Please take a few minutes to complete the written survey that you may receive in the mail after your visit with us. Thank you!             Your Updated Medication List - Protect others around you: Learn how to safely use, store and throw away your medicines at www.disposemymeds.org.          This list is accurate as of: 11/16/17 11:41 AM.  Always use your most recent med list.                   Brand Name Dispense Instructions for use Diagnosis    ALPRAZolam 0.5 MG tablet    XANAX    10 tablet    Take 1 tablet (0.5 mg) by mouth 3 times daily as needed for anxiety    Fear of flying       finasteride 5 MG tablet    PROSCAR     Take 5 mg by mouth daily        tacrolimus 0.1 % ointment    PROTOPIC     Apply topically 2 times daily

## 2023-01-11 DIAGNOSIS — Z85.46 PERSONAL HISTORY OF MALIGNANT NEOPLASM OF PROSTATE: Primary | ICD-10-CM

## 2023-01-11 PROCEDURE — 36415 COLL VENOUS BLD VENIPUNCTURE: CPT | Performed by: FAMILY MEDICINE

## 2023-01-12 LAB — ABBOTT PSA - QUEST: <0.04 NG/ML

## 2023-03-25 ENCOUNTER — HEALTH MAINTENANCE LETTER (OUTPATIENT)
Age: 68
End: 2023-03-25

## 2023-06-07 DIAGNOSIS — C61 MALIGNANT NEOPLASM OF PROSTATE (H): Primary | ICD-10-CM

## 2023-06-07 PROCEDURE — 36415 COLL VENOUS BLD VENIPUNCTURE: CPT | Performed by: FAMILY MEDICINE

## 2023-06-07 NOTE — NURSING NOTE
Lab only non fasting   Order from     [facility]   Dr. Marcos Riggins    Phone [phone]    Fax [phone]

## 2023-06-08 LAB — ABBOTT PSA - QUEST: <0.04 NG/ML

## 2023-06-21 ENCOUNTER — TRANSFERRED RECORDS (OUTPATIENT)
Dept: FAMILY MEDICINE | Facility: CLINIC | Age: 68
End: 2023-06-21

## 2023-10-04 ENCOUNTER — TRANSFERRED RECORDS (OUTPATIENT)
Dept: FAMILY MEDICINE | Facility: CLINIC | Age: 68
End: 2023-10-04

## 2023-10-12 ENCOUNTER — TRANSFERRED RECORDS (OUTPATIENT)
Dept: FAMILY MEDICINE | Facility: CLINIC | Age: 68
End: 2023-10-12

## 2023-10-30 ENCOUNTER — ALLIED HEALTH/NURSE VISIT (OUTPATIENT)
Dept: FAMILY MEDICINE | Facility: CLINIC | Age: 68
End: 2023-10-30

## 2023-10-30 DIAGNOSIS — Z23 NEED FOR VACCINATION: Primary | ICD-10-CM

## 2023-10-30 PROCEDURE — 90662 IIV NO PRSV INCREASED AG IM: CPT | Performed by: FAMILY MEDICINE

## 2023-10-30 PROCEDURE — G0008 ADMIN INFLUENZA VIRUS VAC: HCPCS | Performed by: FAMILY MEDICINE

## 2023-11-06 ENCOUNTER — OFFICE VISIT (OUTPATIENT)
Dept: FAMILY MEDICINE | Facility: CLINIC | Age: 68
End: 2023-11-06

## 2023-11-06 VITALS
OXYGEN SATURATION: 97 % | BODY MASS INDEX: 30.29 KG/M2 | HEART RATE: 93 BPM | HEIGHT: 67 IN | WEIGHT: 193 LBS | TEMPERATURE: 97.9 F | DIASTOLIC BLOOD PRESSURE: 82 MMHG | SYSTOLIC BLOOD PRESSURE: 124 MMHG

## 2023-11-06 DIAGNOSIS — H25.9 SENILE CATARACT OF RIGHT EYE, UNSPECIFIED AGE-RELATED CATARACT TYPE: ICD-10-CM

## 2023-11-06 DIAGNOSIS — Z85.46 PERSONAL HISTORY OF MALIGNANT NEOPLASM OF PROSTATE: ICD-10-CM

## 2023-11-06 DIAGNOSIS — Z01.818 PREOPERATIVE EXAMINATION: Primary | ICD-10-CM

## 2023-11-06 PROCEDURE — 99214 OFFICE O/P EST MOD 30 MIN: CPT

## 2023-11-06 NOTE — LETTER
2023        RE: Alejo Antunez  [address]  [place] [address]        [facility]  [address]  [address]  [facility] [address]  Phone: [phone]  Fax: [phone]  Primary Provider: Americo Brown  Pre-op Performing Provider: JACQUIE SANTANA      PREOPERATIVE EVALUATION:  Today's date: 2023    Alejo is a 68 year old male who presents for a preoperative evaluation. ( 53)    Surgical Information:  Surgery/Procedure: Right eye cataract surgery  Surgery Location: [facility] [place]   Surgeon: Dr. Johnson  Surgery Date: 23  Time of Surgery: TBD  Where patient plans to recover: At home with family  Fax number for surgical facility: [phone]    Assessment & Plan     The proposed surgical procedure is considered LOW risk.    Preoperative examination  - Patient is cleared for surgery. No lab work or EKG required for this low risk surgery.     Senile cataract of right eye, unspecified age-related cataract type    Personal history of malignant neoplasm of prostate    Antiplatelet or Anticoagulation Medication Instructions:  - Patient is not on any chronic antiplatelet or anticoagulation medications.      Additional Medication Instructions:  - Patient was instructed to not have anything to eat or drink 12 hours before the procedure other than small sips of water. He does not take any daily medications.      RECOMMENDATION:  -APPROVAL GIVEN to proceed with proposed procedure.    Subjective     Alejo presents for a preoperative examination for upcoming right eye cataract surgery at [facility] in [place] with Dr. Johnson on 23. He does not take any daily medications.     Past surgeries reviewed, no problems with sedation.    HPI related to upcoming procedure:     1. No - Have you ever had a heart attack or stroke?  2. No - Have you ever had surgery on your heart or blood vessels, such as a stent, coronary  (heart) bypass, or surgery on an artery in the head, neck, heart, or legs?  3. No - Do you have chest pain when you are physically active?  4. No - Do you have a history of heart failure?  5. No - Do you currently have a cold, bronchitis, or symptoms of other respiratory (head and chest) infections?  6. No - Do you have a cough, shortness of breath, or wheezing?  7. No - Do you or anyone in your family have a history of blood clots?  8. No - Do you or anyone in your family have a serious bleeding problem, such as long-lasting bleeding after surgeries or cuts?  9. No - Have you ever had anemia or been told to take iron pills?  10. No - Have you had any abnormal blood loss such as black, tarry or bloody stools, or abnormal vaginal bleeding?  11. No - Have you ever had a blood transfusion?  12. Yes - Are you willing to have a blood transfusion if it is medically needed before, during, or after your surgery?  13. No - Have you or anyone in your family ever had problems with anesthesia (sedation for surgery)?  14. No - Do you have sleep apnea, excessive snoring, or daytime drowsiness?   15. No - Do you have any artifical heart valves or other implanted medical devices, such as a pacemaker, defibrillator, or continuous glucose monitor?  16. No - Do you have any artifical joints?  17. No - Are you allergic to latex?  18. No - Is there any chance that you may be pregnant?    Health Care Directive:  Patient has a Health Care Directive on file    Preoperative Review of :   reviewed - no record of controlled substances prescribed.    Review of Systems  CONSTITUTIONAL: NEGATIVE for fever, chills, change in weight  INTEGUMENTARY/SKIN: NEGATIVE for worrisome rashes, moles or lesions  EYES: NEGATIVE for vision changes or irritation  ENT/MOUTH: NEGATIVE for ear, mouth and throat problems  RESP: NEGATIVE for significant cough or SOB  CV: NEGATIVE for chest pain, palpitations or peripheral edema  GI: NEGATIVE for nausea,  abdominal pain, heartburn, or change in bowel habits  : NEGATIVE for frequency, dysuria, or hematuria  MUSCULOSKELETAL: NEGATIVE for significant arthralgias or myalgia  NEURO: NEGATIVE for weakness, dizziness or paresthesias  ENDOCRINE: NEGATIVE for temperature intolerance, skin/hair changes  HEME: NEGATIVE for bleeding problems  PSYCHIATRIC: NEGATIVE for changes in mood or affect    Patient Active Problem List    Diagnosis Date Noted     Malignant neoplasm of prostate (H) 12/10/2019     Priority: Medium     Other abnormal glucose 10/11/2016     Priority: Medium     Personal history of tobacco use, presenting hazards to health 10/11/2016     Priority: Medium     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  Document previously scanned on 2-17-16.  Validation form completed and sent to be scanned.  Code Status needs to be updated to reflect choices in most recent ACP document. Confirmed/documented designated decision maker(s).  Added by Maribeth Easley RN Advance Care Planning Liaison with Philip Holliday  Advance Care Planning 1/12/2016: ACP Review of Chart / Resources Provided:  Reviewed chart for advance care plan.  Alejo DAVEY Tulio has no plan or code status on file. Discussed available resources and provided with information. Confirmed code status reflects current choices pending further ACP discussions.  Confirmed/documented legally designated decision maker(s). 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     Priority: Medium     CARDIOVASCULAR SCREENING; LDL GOAL LESS THAN 160  07/26/2012     Priority: Medium     Health Care Home 02/23/2015     Priority: Low      Past Medical History:   Diagnosis Date     NO ACTIVE PROBLEMS      Past Surgical History:   Procedure Laterality Date     ARTHROSCOPY SHOULDER ROTATOR CUFF REPAIR Right 2006     BIOPSY PROSTATE NEEDLE / PUNCH / INCISIONAL N/A 4/27/2018    Procedure: BIOPSY, PROSTATE, USING NEEDLE;  Surgeon: Marcos Riggins MD;  Location: [facility];  Service:      CATARACT IOL, RT/LT Left      cornea surgery Left      EYE SURGERY      left eye cataract with lens implant     MOUTH SURGERY       OTHER SURGICAL HISTORY      lymph nodetumor removed from left groin     ND LAP,PROSTATECTOMY,RADICAL,W/NERVE SPARE,INCL ROBOTIC Bilateral 6/19/2018    Procedure: ROBOTIC RADICAL RETROPUBIC PROSTATECTOMY WITH BILATERAL PELVIC LYMPH NODE DISSECTION ;  Surgeon: Marcos Riggins MD;  [place] [facility]  Service: Urology     ROTATOR CUFF REPAIR RT/LT Right 2005     TONSILLECTOMY       TONSILLECTOMY & ADENOIDECTOMY       Current Outpatient Medications   Medication Sig Dispense Refill     ALPRAZolam (XANAX) 0.25 MG tablet Take 1 tablet (0.25 mg) by mouth once as needed for anxiety (For FLYING TO [place] MARCH 28th) For flying 6 tablet 0     Allergies   Allergen Reactions     Amoxicillin-Pot Clavulanate Nausea     Sulfa Antibiotics      Eye drops-redness      Social History     Tobacco Use     Smoking status: Former     Packs/day: 1.00     Years: 35.00     Additional pack years: 0.00     Total pack years: 35.00     Types: Cigarettes     Smokeless tobacco: Never   Substance Use Topics     Alcohol use: Yes     Alcohol/week: 10.0 standard drinks of alcohol     Types: 10 Standard drinks or equivalent per week     Family History   Problem Relation Age of Onset     Eye Disorder Mother         glaucoma     Prostate Cancer Father      History   Drug Use No        Objective   /82 (BP Location: Right arm, Patient Position: Sitting, Cuff Size:  "Adult Large)   Pulse 93   Temp 97.9  F (36.6  C) (Temporal)   Ht 1.689 m (5' 6.5\")   Wt 87.5 kg (193 lb)   SpO2 97%   BMI 30.68 kg/m      Physical Exam   GENERAL APPEARANCE: healthy, alert and no distress  EYES: EOMI,  PERRL  HENT: ear canals and TM's normal and nose and mouth without ulcers or lesions  NECK: no adenopathy, no asymmetry, masses, or scars and thyroid normal to palpation  RESP: lungs clear to auscultation - no rales, rhonchi or wheezes  CV: regular rates and rhythm, normal S1 S2, no S3 or S4 and no murmur, click or rub  ABDOMEN:  soft, nontender, no HSM or masses and bowel sounds normal  MS: extremities normal- no gross deformities noted, no evidence of inflammation in joints, FROM in all extremities.  SKIN: no suspicious lesions or rashes  NEURO: Normal strength and tone, sensory exam grossly normal, mentation intact and speech normal  PSYCH: mentation appears normal. and affect normal/bright    Diagnostics:  No labs were ordered during this visit.     No EKG required for low risk surgery (cataract, skin procedure, breast biopsy, etc).    Revised Cardiac Risk Index (RCRI):  The patient has the following serious cardiovascular risks for perioperative complications:   - No serious cardiac risks = 0 points     RCRI Interpretation: 0 points: Class I (very low risk - 0.4% complication rate)    Signed Electronically by: Patria Palma PA-C  Copy of this evaluation report is provided to requesting physician.          Sincerely,        Patria Palma PA-C      "

## 2023-11-06 NOTE — PROGRESS NOTES
[facility]  [address]  [address]  [facility] [address]  Phone: [phone]  Fax: [phone]  Primary Provider: Americo Brown  Pre-op Performing Provider: JACQUIE SANTANA      PREOPERATIVE EVALUATION:  Today's date: 2023    Alejo is a 68 year old male who presents for a preoperative evaluation. ( 53)    Surgical Information:  Surgery/Procedure: Right eye cataract surgery  Surgery Location: [facility] [place]   Surgeon: Dr. Johnson  Surgery Date: 23  Time of Surgery: TBD  Where patient plans to recover: At home with family  Fax number for surgical facility: [phone]    Assessment & Plan     The proposed surgical procedure is considered LOW risk.    Preoperative examination  - Patient is cleared for surgery. No lab work or EKG required for this low risk surgery.     Senile cataract of right eye, unspecified age-related cataract type    Personal history of malignant neoplasm of prostate    Antiplatelet or Anticoagulation Medication Instructions:  - Patient is not on any chronic antiplatelet or anticoagulation medications.      Additional Medication Instructions:  - Patient was instructed to not have anything to eat or drink 12 hours before the procedure other than small sips of water. He does not take any daily medications.      RECOMMENDATION:  -APPROVAL GIVEN to proceed with proposed procedure.    Subjective     Alejo presents for a preoperative examination for upcoming right eye cataract surgery at [facility] in [place] with Dr. Johnson on 23. He does not take any daily medications.     Past surgeries reviewed, no problems with sedation.    HPI related to upcoming procedure:     1. No - Have you ever had a heart attack or stroke?  2. No - Have you ever had surgery on your heart or blood vessels, such as a stent, coronary (heart) bypass, or surgery on an artery in the head, neck, heart, or legs?  3. No - Do  you have chest pain when you are physically active?  4. No - Do you have a history of heart failure?  5. No - Do you currently have a cold, bronchitis, or symptoms of other respiratory (head and chest) infections?  6. No - Do you have a cough, shortness of breath, or wheezing?  7. No - Do you or anyone in your family have a history of blood clots?  8. No - Do you or anyone in your family have a serious bleeding problem, such as long-lasting bleeding after surgeries or cuts?  9. No - Have you ever had anemia or been told to take iron pills?  10. No - Have you had any abnormal blood loss such as black, tarry or bloody stools, or abnormal vaginal bleeding?  11. No - Have you ever had a blood transfusion?  12. Yes - Are you willing to have a blood transfusion if it is medically needed before, during, or after your surgery?  13. No - Have you or anyone in your family ever had problems with anesthesia (sedation for surgery)?  14. No - Do you have sleep apnea, excessive snoring, or daytime drowsiness?   15. No - Do you have any artifical heart valves or other implanted medical devices, such as a pacemaker, defibrillator, or continuous glucose monitor?  16. No - Do you have any artifical joints?  17. No - Are you allergic to latex?  18. No - Is there any chance that you may be pregnant?    Health Care Directive:  Patient has a Health Care Directive on file    Preoperative Review of :   reviewed - no record of controlled substances prescribed.    Review of Systems  CONSTITUTIONAL: NEGATIVE for fever, chills, change in weight  INTEGUMENTARY/SKIN: NEGATIVE for worrisome rashes, moles or lesions  EYES: NEGATIVE for vision changes or irritation  ENT/MOUTH: NEGATIVE for ear, mouth and throat problems  RESP: NEGATIVE for significant cough or SOB  CV: NEGATIVE for chest pain, palpitations or peripheral edema  GI: NEGATIVE for nausea, abdominal pain, heartburn, or change in bowel habits  : NEGATIVE for frequency, dysuria, or  hematuria  MUSCULOSKELETAL: NEGATIVE for significant arthralgias or myalgia  NEURO: NEGATIVE for weakness, dizziness or paresthesias  ENDOCRINE: NEGATIVE for temperature intolerance, skin/hair changes  HEME: NEGATIVE for bleeding problems  PSYCHIATRIC: NEGATIVE for changes in mood or affect    Patient Active Problem List    Diagnosis Date Noted     Malignant neoplasm of prostate (H) 12/10/2019     Priority: Medium     Other abnormal glucose 10/11/2016     Priority: Medium     Personal history of tobacco use, presenting hazards to health 10/11/2016     Priority: Medium     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  Document previously scanned on 2-17-16.  Validation form completed and sent to be scanned.  Code Status needs to be updated to reflect choices in most recent ACP document. Confirmed/documented designated decision maker(s).  Added by Maribeth Easley RN Advance Care Planning Liaison with Philip Holliday  Advance Care Planning 1/12/2016: ACP Review of Chart / Resources Provided:  Reviewed chart for advance care plan.  Alejo PETAR Antunez has no plan or code status on file. Discussed available resources and provided with information. Confirmed code status reflects current choices pending further ACP discussions.  Confirmed/documented legally designated decision maker(s). 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     Priority: Medium     CARDIOVASCULAR SCREENING; LDL GOAL LESS THAN 160 07/26/2012     Priority: Medium     Health Care Home 02/23/2015     Priority: Low      Past  "Medical History:   Diagnosis Date     NO ACTIVE PROBLEMS      Past Surgical History:   Procedure Laterality Date     ARTHROSCOPY SHOULDER ROTATOR CUFF REPAIR Right 2006     BIOPSY PROSTATE NEEDLE / PUNCH / INCISIONAL N/A 4/27/2018    Procedure: BIOPSY, PROSTATE, USING NEEDLE;  Surgeon: Marcos Riggins MD;  Location: [facility];  Service:      CATARACT IOL, RT/LT Left      cornea surgery Left      EYE SURGERY      left eye cataract with lens implant     MOUTH SURGERY       OTHER SURGICAL HISTORY      lymph nodetumor removed from left groin     DC LAP,PROSTATECTOMY,RADICAL,W/NERVE SPARE,INCL ROBOTIC Bilateral 6/19/2018    Procedure: ROBOTIC RADICAL RETROPUBIC PROSTATECTOMY WITH BILATERAL PELVIC LYMPH NODE DISSECTION ;  Surgeon: Marcos Riggins MD;  Location: [facility];  Service: Urology     ROTATOR CUFF REPAIR RT/LT Right 2005     TONSILLECTOMY       TONSILLECTOMY & ADENOIDECTOMY       Current Outpatient Medications   Medication Sig Dispense Refill     ALPRAZolam (XANAX) 0.25 MG tablet Take 1 tablet (0.25 mg) by mouth once as needed for anxiety (For FLYING TO [place] MARCH 28th) For flying 6 tablet 0     Allergies   Allergen Reactions     Amoxicillin-Pot Clavulanate Nausea     Sulfa Antibiotics      Eye drops-redness      Social History     Tobacco Use     Smoking status: Former     Packs/day: 1.00     Years: 35.00     Additional pack years: 0.00     Total pack years: 35.00     Types: Cigarettes     Smokeless tobacco: Never   Substance Use Topics     Alcohol use: Yes     Alcohol/week: 10.0 standard drinks of alcohol     Types: 10 Standard drinks or equivalent per week     Family History   Problem Relation Age of Onset     Eye Disorder Mother         glaucoma     Prostate Cancer Father      History   Drug Use No         Objective   /82 (BP Location: Right arm, Patient Position: Sitting, Cuff Size: Adult Large)   Pulse 93   Temp 97.9  F (36.6  C) (Temporal)   Ht 1.689 m (5' 6.5\")   Wt " 87.5 kg (193 lb)   SpO2 97%   BMI 30.68 kg/m      Physical Exam   GENERAL APPEARANCE: healthy, alert and no distress  EYES: EOMI,  PERRL  HENT: ear canals and TM's normal and nose and mouth without ulcers or lesions  NECK: no adenopathy, no asymmetry, masses, or scars and thyroid normal to palpation  RESP: lungs clear to auscultation - no rales, rhonchi or wheezes  CV: regular rates and rhythm, normal S1 S2, no S3 or S4 and no murmur, click or rub  ABDOMEN:  soft, nontender, no HSM or masses and bowel sounds normal  MS: extremities normal- no gross deformities noted, no evidence of inflammation in joints, FROM in all extremities.  SKIN: no suspicious lesions or rashes  NEURO: Normal strength and tone, sensory exam grossly normal, mentation intact and speech normal  PSYCH: mentation appears normal. and affect normal/bright    Diagnostics:  No labs were ordered during this visit.     No EKG required for low risk surgery (cataract, skin procedure, breast biopsy, etc).    Revised Cardiac Risk Index (RCRI):  The patient has the following serious cardiovascular risks for perioperative complications:   - No serious cardiac risks = 0 points     RCRI Interpretation: 0 points: Class I (very low risk - 0.4% complication rate)    Signed Electronically by: Patria Palma PA-C  Copy of this evaluation report is provided to requesting physician.

## 2023-11-06 NOTE — NURSING NOTE
Chief Complaint   Patient presents with    Pre-Op Exam     Surgery/Procedure: Right eye cataracts  Surgery Location: [facility] [place]   Surgeon: Dr. Johnson  Surgery Date: 11/29/23

## 2024-05-15 ENCOUNTER — TRANSFERRED RECORDS (OUTPATIENT)
Dept: FAMILY MEDICINE | Facility: CLINIC | Age: 69
End: 2024-05-15

## 2024-05-25 ENCOUNTER — HEALTH MAINTENANCE LETTER (OUTPATIENT)
Age: 69
End: 2024-05-25

## 2024-06-21 ENCOUNTER — TRANSFERRED RECORDS (OUTPATIENT)
Dept: FAMILY MEDICINE | Facility: CLINIC | Age: 69
End: 2024-06-21

## 2024-09-09 ENCOUNTER — TRANSFERRED RECORDS (OUTPATIENT)
Dept: FAMILY MEDICINE | Facility: CLINIC | Age: 69
End: 2024-09-09

## 2024-10-11 ENCOUNTER — ALLIED HEALTH/NURSE VISIT (OUTPATIENT)
Dept: FAMILY MEDICINE | Facility: CLINIC | Age: 69
End: 2024-10-11

## 2024-10-11 DIAGNOSIS — Z23 NEED FOR VACCINATION: Primary | ICD-10-CM

## 2024-10-11 PROCEDURE — G0008 ADMIN INFLUENZA VIRUS VAC: HCPCS | Performed by: FAMILY MEDICINE

## 2024-10-11 PROCEDURE — 90662 IIV NO PRSV INCREASED AG IM: CPT | Performed by: FAMILY MEDICINE

## 2024-10-23 ENCOUNTER — OFFICE VISIT (OUTPATIENT)
Dept: FAMILY MEDICINE | Facility: CLINIC | Age: 69
End: 2024-10-23

## 2024-10-23 VITALS
HEIGHT: 67 IN | RESPIRATION RATE: 18 BRPM | DIASTOLIC BLOOD PRESSURE: 60 MMHG | SYSTOLIC BLOOD PRESSURE: 118 MMHG | HEART RATE: 68 BPM | BODY MASS INDEX: 29.98 KG/M2 | TEMPERATURE: 97.2 F | WEIGHT: 191 LBS

## 2024-10-23 DIAGNOSIS — Z00.00 ENCOUNTER FOR ANNUAL WELLNESS EXAM IN MEDICARE PATIENT: Primary | ICD-10-CM

## 2024-10-23 DIAGNOSIS — Z85.46 PERSONAL HISTORY OF MALIGNANT NEOPLASM OF PROSTATE: ICD-10-CM

## 2024-10-23 PROCEDURE — G0438 PPPS, INITIAL VISIT: HCPCS | Performed by: FAMILY MEDICINE

## 2024-10-23 RX ORDER — SODIUM CHLORIDE 5 %
OINTMENT (GRAM) OPHTHALMIC (EYE) AT BEDTIME
COMMUNITY
Start: 2024-10-11 | End: 2024-10-23

## 2024-10-23 RX ORDER — PREDNISOLONE ACETATE 10 MG/ML
SUSPENSION/ DROPS OPHTHALMIC
COMMUNITY
Start: 2024-10-09 | End: 2024-10-23

## 2024-10-23 NOTE — PROGRESS NOTES
Alejo Antunez is a 69 year old male who presents for Medicare Annual Wellness Visit.    Current providers caring for this patient include:  Patient Care Team:  Americo Brown MD as PCP - General (Family Practice)  Americo Brown MD as Assigned PCP    Complete Medical and Social history reviewed with patient, outlined below.    Patient Active Problem List   Diagnosis    CARDIOVASCULAR SCREENING; LDL GOAL LESS THAN 160    Traumatic cataract of eye    Tobacco use disorder    Family history of prostate cancer    Fear of flying    ACP (advance care planning)    Other abnormal glucose    Personal history of tobacco use, presenting hazards to health    Psoriasis annularis    Malignant neoplasm of prostate (H)       Past Medical History:   Diagnosis Date    NO ACTIVE PROBLEMS        Past Surgical History:   Procedure Laterality Date    ARTHROSCOPY SHOULDER ROTATOR CUFF REPAIR Right 2006    BIOPSY PROSTATE NEEDLE / PUNCH / INCISIONAL N/A 4/27/2018    Procedure: BIOPSY, PROSTATE, USING NEEDLE;  Surgeon: Marcos Riggins MD;  Location: [facility];  Service:     CATARACT IOL, RT/LT Left     cornea surgery Left     EYE SURGERY      left eye cataract with lens implant    MOUTH SURGERY      OTHER SURGICAL HISTORY      lymph nodetumor removed from left groin    [place] LAP,PROSTATECTOMY,RADICAL,W/NERVE SPARE,INCL ROBOTIC Bilateral 6/19/2018    Procedure: ROBOTIC RADICAL RETROPUBIC PROSTATECTOMY WITH BILATERAL PELVIC LYMPH NODE DISSECTION ;  Surgeon: Marcos Riggins MD;  [facility];  Service: Urology    ROTATOR CUFF REPAIR RT/LT Right 2005    TONSILLECTOMY      TONSILLECTOMY & ADENOIDECTOMY         Family History   Problem Relation Age of Onset    Eye Disorder Mother         glaucoma    Prostate Cancer Father        Social History     Tobacco Use    Smoking status: Former     Current packs/day: 1.00     Average packs/day: 1 pack/day for 35.0 years (35.0 ttl pk-yrs)     Types:  "Cigarettes    Smokeless tobacco: Never   Substance Use Topics    Alcohol use: Yes     Alcohol/week: 10.0 standard drinks of alcohol     Types: 10 Standard drinks or equivalent per week       Diet: regular, low salt/low fat  Physical Activity: active without specific exercise program  Depression Screen:    Over the past 2 weeks, patient has felt down, depressed, or hopeless:  No    Over the past 2 weeks, patient has felt little interest or pleasure in doing things: No    Functional ability/Safety screen:  Up and go test (able to get up and walk longer than 30 seconds): Passed  Patient needs assistance with: nothing  Patient's home has the following possible safety concerns: none identified  Patient has concerns about his hearing:  No  Cognitive Screen  Patient repeats three objects (ball, flag, tree)      Clock drawing test:   NORMAL  Recalls three objects after 3 minutes (ball,flag,tree):                                                                                               recalls 3 objects (3 points)    Physical Exam:  /60 (BP Location: Left arm, Patient Position: Chair, Cuff Size: Adult Regular)   Pulse 68   Temp 97.2  F (36.2  C) (Temporal)   Resp 18   Ht 1.689 m (5' 6.5\")   Wt 86.6 kg (191 lb)   BMI 30.37 kg/m     Body mass index is 30.37 kg/m .              End of Life Planning:   Patient currently has an advanced directive: Yes.  Practitioner is supportive of decision.    Education/Counseling:   Based on review of the above information, the following items were addressed:      Appropriate preventive services were discussed with this patient, including applicable screening as appropriate for cardiovascular disease, diabetes, osteopenia/osteoporosis, and glaucoma.  As appropriate for age/gender, discussed screening for colorectal cancer, prostate cancer, breast cancer, and cervical cancer.   Checklist reviewing preventive services available has been given to the patient.             "

## 2025-01-08 ENCOUNTER — APPOINTMENT (OUTPATIENT)
Age: 70
Setting detail: DERMATOLOGY
End: 2025-01-08

## 2025-01-08 DIAGNOSIS — L82.1 OTHER SEBORRHEIC KERATOSIS: ICD-10-CM

## 2025-01-08 DIAGNOSIS — Z71.89 OTHER SPECIFIED COUNSELING: ICD-10-CM

## 2025-01-08 DIAGNOSIS — L40.8 OTHER PSORIASIS: ICD-10-CM | Status: STABLE

## 2025-01-08 DIAGNOSIS — D22 MELANOCYTIC NEVI: ICD-10-CM

## 2025-01-08 DIAGNOSIS — D18.0 HEMANGIOMA: ICD-10-CM

## 2025-01-08 DIAGNOSIS — L57.0 ACTINIC KERATOSIS: ICD-10-CM | Status: STABLE

## 2025-01-08 PROBLEM — L30.9 DERMATITIS, UNSPECIFIED: Status: ACTIVE | Noted: 2025-01-08

## 2025-01-08 PROBLEM — D18.01 HEMANGIOMA OF SKIN AND SUBCUTANEOUS TISSUE: Status: ACTIVE | Noted: 2025-01-08

## 2025-01-08 PROBLEM — D22.5 MELANOCYTIC NEVI OF TRUNK: Status: ACTIVE | Noted: 2025-01-08

## 2025-01-08 PROCEDURE — ? PRESCRIPTION MEDICATION MANAGEMENT

## 2025-01-08 PROCEDURE — 99214 OFFICE O/P EST MOD 30 MIN: CPT

## 2025-01-08 PROCEDURE — ? PRESCRIPTION

## 2025-01-08 PROCEDURE — ? COUNSELING

## 2025-01-08 PROCEDURE — ? SUNSCREEN RECOMMENDATIONS

## 2025-01-08 PROCEDURE — ? DIAGNOSIS COMMENT

## 2025-01-08 RX ORDER — TACROLIMUS 1 MG/G
OINTMENT TOPICAL
Qty: 30 | Refills: 1 | Status: ERX | COMMUNITY
Start: 2025-01-08

## 2025-01-08 RX ADMIN — TACROLIMUS: 1 OINTMENT TOPICAL at 00:00

## 2025-01-08 ASSESSMENT — LOCATION DETAILED DESCRIPTION DERM
LOCATION DETAILED: EPIGASTRIC SKIN
LOCATION DETAILED: RIGHT SUPERIOR MEDIAL MIDBACK
LOCATION DETAILED: MIDDLE STERNUM
LOCATION DETAILED: LEFT INFERIOR MEDIAL FOREHEAD
LOCATION DETAILED: INFERIOR THORACIC SPINE
LOCATION DETAILED: SUPERIOR THORACIC SPINE

## 2025-01-08 ASSESSMENT — LOCATION SIMPLE DESCRIPTION DERM
LOCATION SIMPLE: UPPER BACK
LOCATION SIMPLE: CHEST
LOCATION SIMPLE: ABDOMEN
LOCATION SIMPLE: RIGHT LOWER BACK
LOCATION SIMPLE: LEFT FOREHEAD

## 2025-01-08 ASSESSMENT — LOCATION ZONE DERM
LOCATION ZONE: FACE
LOCATION ZONE: TRUNK

## 2025-01-08 NOTE — HPI: SECONDARY COMPLAINT
Additional History: He states that the condition is stable and treats with the Protopic ointment qod and has considered decreasing to once every 3rd day. He has never used the Nystatin powder.

## 2025-01-08 NOTE — HPI: FULL BODY SKIN EXAMINATION
What Type Of Note Output Would You Prefer (Optional)?: Bullet Format
What Is The Reason For Today's Visit?: Annual Full Body Skin Examination with No Concerns
What Is The Reason For Today's Visit? (Being Monitored For X): concerning skin lesions on an annual basis
Additional History: No actinic keratoses at his last visit.

## 2025-01-08 NOTE — PROCEDURE: PRESCRIPTION MEDICATION MANAGEMENT
Continue Regimen: Protopic ointment qod. Refills sent today
Discontinue Regimen: Nystatin powder
Detail Level: Zone
Render In Strict Bullet Format?: No

## 2025-02-04 ENCOUNTER — MYC MEDICAL ADVICE (OUTPATIENT)
Dept: FAMILY MEDICINE | Facility: CLINIC | Age: 70
End: 2025-02-04

## 2025-02-04 DIAGNOSIS — F40.243 FEAR OF FLYING: ICD-10-CM

## 2025-02-04 RX ORDER — ALPRAZOLAM 0.25 MG
0.25 TABLET ORAL
Qty: 6 TABLET | Refills: 0 | Status: SHIPPED | OUTPATIENT
Start: 2025-02-04

## 2025-02-04 NOTE — TELEPHONE ENCOUNTER
Patient is requesting a refill of  Pending Prescriptions:                       Disp   Refills    ALPRAZolam (XANAX) 0.25 MG tablet         6 tabl*0            Sig: Take 1 tablet (0.25 mg) by mouth once as needed           for anxiety (For FLYING TO [place] MARCH 28th). For flying    He was last seen for his physical in October 2024.

## 2025-03-20 ENCOUNTER — OFFICE VISIT (OUTPATIENT)
Dept: FAMILY MEDICINE | Facility: CLINIC | Age: 70
End: 2025-03-20

## 2025-03-20 VITALS
WEIGHT: 191 LBS | HEART RATE: 81 BPM | SYSTOLIC BLOOD PRESSURE: 118 MMHG | BODY MASS INDEX: 29.98 KG/M2 | HEIGHT: 67 IN | TEMPERATURE: 97.9 F | OXYGEN SATURATION: 96 % | RESPIRATION RATE: 20 BRPM | DIASTOLIC BLOOD PRESSURE: 72 MMHG

## 2025-03-20 DIAGNOSIS — J01.00 ACUTE NON-RECURRENT MAXILLARY SINUSITIS: Primary | ICD-10-CM

## 2025-03-20 RX ORDER — AZITHROMYCIN 250 MG/1
TABLET, FILM COATED ORAL
Qty: 6 TABLET | Refills: 0 | Status: SHIPPED | OUTPATIENT
Start: 2025-03-20

## 2025-03-20 NOTE — PROGRESS NOTES
SUBJECTIVE:   Alejo Antunez is a 70 year old male who complains of cough, nasal congestion, yellow and thick nasal discharge, facial pressure, cough, and fatigue for 14 days-feels chest congestion and deep cough were present but have resolved. He denies a history of productive cough, sweats, chills, and myalgias and does not a history of asthma. Patient does not smoke cigarettes.    Pt leaving for trip this weekend    Mucinex D has been helpful, has also used some afrin here and there      Patient Active Problem List   Diagnosis    CARDIOVASCULAR SCREENING; LDL GOAL LESS THAN 160    Traumatic cataract of eye    Tobacco use disorder    Family history of prostate cancer    Fear of flying    ACP (advance care planning)    Other abnormal glucose    Personal history of tobacco use, presenting hazards to health    Psoriasis annularis    Malignant neoplasm of prostate (H)       Past Medical History:   Diagnosis Date    NO ACTIVE PROBLEMS        Family History   Problem Relation Age of Onset    Eye Disorder Mother         glaucoma    Prostate Cancer Father        Social History     Socioeconomic History    Marital status:      Spouse name: Not on file    Number of children: Not on file    Years of education: Not on file    Highest education level: Not on file   Occupational History     Comment:  school system    Tobacco Use    Smoking status: Former     Current packs/day: 1.00     Average packs/day: 1 pack/day for 35.0 years (35.0 ttl pk-yrs)     Types: Cigarettes     Passive exposure: Past    Smokeless tobacco: Never   Substance and Sexual Activity    Alcohol use: Yes     Alcohol/week: 10.0 standard drinks of alcohol     Types: 10 Standard drinks or equivalent per week    Drug use: No    Sexual activity: Yes     Partners: Female   Other Topics Concern    Parent/sibling w/ CABG, MI or angioplasty before 65F 55M? No     Service No    Blood Transfusions No    Caffeine Concern No    Occupational  Exposure No    Hobby Hazards No    Sleep Concern No    Stress Concern No    Weight Concern No    Special Diet No    Back Care No    Exercise Not Asked    Bike Helmet Not Asked    Seat Belt Yes    Self-Exams Not Asked   Social History Narrative    Not on file     Social Drivers of Health     Financial Resource Strain: Not on file   Food Insecurity: Not on file   Transportation Needs: Not on file   Physical Activity: Not on file   Stress: Not on file   Social Connections: Not on file   Interpersonal Safety: Not on file   Housing Stability: Not on file       Past Surgical History:   Procedure Laterality Date    ARTHROSCOPY SHOULDER ROTATOR CUFF REPAIR Right 2006    BIOPSY PROSTATE NEEDLE / PUNCH / INCISIONAL N/A 4/27/2018    Procedure: BIOPSY, PROSTATE, USING NEEDLE;  Surgeon: Marcos Riggins MD;  Location: [facility];  Service:     CATARACT IOL, RT/LT Left     cornea surgery Left     EYE SURGERY      left eye cataract with lens implant    MOUTH SURGERY      OTHER SURGICAL HISTORY      lymph nodetumor removed from left groin    MA LAP,PROSTATECTOMY,RADICAL,W/NERVE SPARE,INCL ROBOTIC Bilateral 6/19/2018    Procedure: ROBOTIC RADICAL RETROPUBIC PROSTATECTOMY WITH BILATERAL PELVIC LYMPH NODE DISSECTION ;  Surgeon: Marcos Riggins MD;  Location: [facility];  Service: Urology    ROTATOR CUFF REPAIR RT/LT Right 2005    TONSILLECTOMY      TONSILLECTOMY & ADENOIDECTOMY         Current Outpatient Medications   Medication Sig Dispense Refill    ALPRAZolam (XANAX) 0.25 MG tablet Take 1 tablet (0.25 mg) by mouth once as needed for anxiety (For FLYING TO [place] MARCH 28th). For flying 6 tablet 0     No current facility-administered medications for this visit.        Allergies: Amoxicillin-pot clavulanate and Sulfa antibiotics      Immunization History   Administered Date(s) Administered    COVID-19 Bivalent 18+ (Moderna) 11/14/2022    COVID-19 Monovalent 18+ (Moderna) 03/04/2021, 04/01/2021, 10/26/2021,  "04/18/2022    Flu, Unspecified 10/01/2020    Influenza (High Dose) Trivalent,PF (Fluzone) 10/11/2024    Influenza Vaccine 65+ (Fluzone HD) 11/03/2022, 10/30/2023    Influenza Vaccine >6 months,quad, PF 11/19/2015, 10/11/2016, 10/19/2017, 10/25/2018, 11/14/2019, 09/24/2020, 10/29/2021    Pneumo Conj 13-V (2010&after) 10/15/2016, 10/02/2018    Pneumococcal 23 valent 10/15/2018, 10/01/2019, 12/04/2019    TD,PF 7+ (Tenivac) 02/07/2005    TDAP Vaccine (Boostrix) 02/05/2015    Zoster recombinant adjuvanted (Shingrix) 08/22/2019, 11/19/2019    Zoster vaccine, live 01/30/2015         OBJECTIVE:/72 (BP Location: Right arm, Patient Position: Chair, Cuff Size: Adult Regular)   Pulse 81   Temp 97.9  F (36.6  C) (Temporal)   Resp 20   Ht 1.689 m (5' 6.5\")   Wt 86.6 kg (191 lb)   SpO2 96%   BMI 30.37 kg/m     He appears well, vital signs are as noted by the nurse. Ears normal.  Throat and pharynx normal.  Neck supple. No adenopathy in the neck. Nose is congested. Sinuses non tender. The chest is clear, without wheezes or rales.    ASSESSMENT:   Sinusitis- possibly viral but time course beginning to suggest bacterial infection-we agree to use abx given upcoming travel    PLAN:Zpack (OCN allergic)  Symptomatic therapy suggested: push fluids, rest, and use acetaminophen, decongestant of choice as needed. Call or return to clinic prn if these symptoms worsen or fail to improve as anticipated.   "

## 2025-03-20 NOTE — NURSING NOTE
Alejo Antunez is here for a cough for the past 2 weeks. Started with congestion and drainage. Still congested in the sinuses.    Questioned patient about current smoking habits.  Pt. quit smoking some time ago.  PULSE regular  My Chart: active  CLASSIFICATION OF OVERWEIGHT AND OBESITY BY BMI                        Obesity Class           BMI(kg/m2)  Underweight                                    < 18.5  Normal                                         18.5-24.9  Overweight                                     25.0-29.9  OBESITY                     I                  30.0-34.9                             II                 35.0-39.9  EXTREME OBESITY             III                >40                            Patient's  BMI Body mass index is 30.37 kg/m .  http://hin.nhlbi.nih.gov/menuplanner/menu.cgi  Pre-visit planning  Immunizations - up to date  Colonoscopy - is up to date  Mammogram -   Asthma -   PHQ9 -    CHAPO-7 -      The patient has verbalized that it is ok to leave a detailed voice message on the patient's cell phone with results/recommendations from this visit.

## 2025-04-03 ENCOUNTER — TRANSFERRED RECORDS (OUTPATIENT)
Dept: FAMILY MEDICINE | Facility: CLINIC | Age: 70
End: 2025-04-03

## 2025-04-09 ENCOUNTER — MYC MEDICAL ADVICE (OUTPATIENT)
Dept: FAMILY MEDICINE | Facility: CLINIC | Age: 70
End: 2025-04-09

## 2025-04-10 ENCOUNTER — OFFICE VISIT (OUTPATIENT)
Dept: FAMILY MEDICINE | Facility: CLINIC | Age: 70
End: 2025-04-10

## 2025-04-10 VITALS
SYSTOLIC BLOOD PRESSURE: 112 MMHG | WEIGHT: 191.2 LBS | HEART RATE: 81 BPM | BODY MASS INDEX: 30.4 KG/M2 | OXYGEN SATURATION: 95 % | DIASTOLIC BLOOD PRESSURE: 66 MMHG | TEMPERATURE: 97.6 F

## 2025-04-10 DIAGNOSIS — R42 VERTIGO: Primary | ICD-10-CM

## 2025-04-10 RX ORDER — CYCLOBENZAPRINE HCL 5 MG
5 TABLET ORAL
COMMUNITY
Start: 2025-04-03

## 2025-04-10 NOTE — NURSING NOTE
Chief Complaint   Patient presents with    Consult     Possible vertigo - for about 10 days now  When he sits up in the morning from bed it feels like he's spinning, also occurs when he lays down on his back but it goes away after he closes his eyes for a few minutes.      Pre-visit Screening:  Immunizations:  not up to date - has to go to pharmacy   Colonoscopy:  is due and to be scheduled by patient for later completion  Mammogram: na  Asthma Action Test/Plan:  yuniel  PHQ9:  na  GAD7:  na  Questioned patient about current smoking habits Pt. quit smoking some time ago.  Ok to leave detailed message on voice mail for today's visit only yes, phone # [phone] (home)

## 2025-04-10 NOTE — PROGRESS NOTES
"  Assessment & Plan     Vertigo  Pt symptoms consistent with peripheral vertigo, episodes short lived and precipitated by head movement, no tinnitus or hearing loss to suggest menieres, no central symptoms noted, no progressive worsening    Discussed [facility] function, proposed etiologies of peripheral vertigo including viral infection-pt did have virus 2 weeks ago.    Discussed treatments including meclizine and Epley exercises-pt symptoms mild so doubt meclizine would be beneficial    We agree to have pt try Epley exercises at home, handout provided-also recommend he look for videos on YouTube    f/u if not improving or worsening -could refer to dizzy therapy or consider further testing    patient given instructions to go to emergency department immediately if worsening of symptoms and verbalizes this understanding             BMI  Estimated body mass index is 30.4 kg/m  as calculated from the following:    Height as of 3/20/25: 1.689 m (5' 6.5\").    Weight as of this encounter: 86.7 kg (191 lb 3.2 oz).   Weight management plan: Discussed healthy diet and exercise guidelines      Regular exercise    No follow-ups on file.    Subjective   Alejo is a 70 year old, presenting for the following health issues:  Consult (Possible vertigo - for about 10 days now/When he sits up in the morning from bed it feels like he's spinning, also occurs when he lays down on his back but it goes away after he closes his eyes for a few minutes. )    HPI        Dizziness  Onset/Duration: 10 days-awoke to get out of bed and sat up- started spinning, lasted < 1 min if stays still  Description:   Do you feel faint: No  Does it feel like the surroundings (bed, room) are moving: YES  Unsteady/off balance: YES  Have you passed out or fallen: No  Intensity: mild, moderate  Progression of Symptoms: intermittent episodes lasting < 1 in, improving in last 2 days  Accompanying Signs & Symptoms:  Heart palpitations or chest pain: " No  Nausea, vomiting: No  Weakness or lack of coordination in arms or legs: No  Vision or speech changes: No  Numbness or tingling: No  Ringing in ears (Tinnitus): YES- mild, chronic  Hearing Loss: No  History:   Head trauma/concussion history: No  Previous similar symptoms: No  Recent bleeding history: No  Any new medications (BP?): No  Precipitating factors:   Worse with activity: No  Worse with head movement: YES  Alleviating factors:   Does staying in a fixed position give relief: YES  Therapies tried and outcome: rest        Review of Systems  Constitutional, HEENT, cardiovascular, pulmonary, gi and gu systems are negative, except as otherwise noted.      Objective    /66 (BP Location: Right arm, Patient Position: Sitting, Cuff Size: Adult Large)   Pulse 81   Temp 97.6  F (36.4  C) (Temporal)   Wt 86.7 kg (191 lb 3.2 oz)   SpO2 95%   BMI 30.40 kg/m    Body mass index is 30.4 kg/m .  Physical Exam   GENERAL: alert and no distress  EYES: Eyes grossly normal to inspection, PERRL and conjunctivae and sclerae normal  HENT: ear canals and TM's normal, nose and mouth without ulcers or lesions  NECK: no adenopathy, no asymmetry, masses, or scars  RESP: lungs clear to auscultation - no rales, rhonchi or wheezes  CV: regular rate and rhythm, normal S1 S2, no S3 or S4, no murmur, click or rub, no peripheral edema  MS: no gross musculoskeletal defects noted, no edema  NEURO: Normal strength and tone, mentation intact and speech normal  NEURO: cranial nerves 2-12 intact  Pt does have symptoms elicited with Barany   PSYCH: mentation appears normal, affect normal/bright    Orders Only on 06/07/2023   Component Date Value Ref Range Status    Abbott PSA 06/07/2023 <0.04  < OR = 4.00 ng/mL Final    Comment: The total PSA value from this assay system is   standardized against the WHO standard. The test   result will be approximately 20% lower when compared   to the equimolar-standardized total PSA (Clarence   Flako).  Comparison of serial PSA results should be   interpreted with this fact in mind.     This test was performed using the Siemens   chemiluminescent method. Values obtained from   different assay methods cannot be used  interchangeably. PSA levels, regardless of  value, should not be interpreted as absolute  evidence of the presence or absence of disease.             Signed Electronically by: Americo Brown MD

## 2025-04-11 ENCOUNTER — OFFICE VISIT (OUTPATIENT)
Dept: FAMILY MEDICINE | Facility: CLINIC | Age: 70
End: 2025-04-11

## 2025-04-11 VITALS
WEIGHT: 190 LBS | DIASTOLIC BLOOD PRESSURE: 72 MMHG | BODY MASS INDEX: 30.21 KG/M2 | SYSTOLIC BLOOD PRESSURE: 104 MMHG | OXYGEN SATURATION: 99 % | HEART RATE: 73 BPM

## 2025-04-11 DIAGNOSIS — R42 VERTIGO: Primary | ICD-10-CM

## 2025-04-11 PROCEDURE — 99214 OFFICE O/P EST MOD 30 MIN: CPT

## 2025-04-11 RX ORDER — MECLIZINE HYDROCHLORIDE 25 MG/1
25 TABLET ORAL 3 TIMES DAILY PRN
Qty: 30 TABLET | Refills: 0 | Status: SHIPPED | OUTPATIENT
Start: 2025-04-11 | End: 2025-04-15

## 2025-04-11 NOTE — PATIENT INSTRUCTIONS
Meclizine 25 mg three times per day as needed, please monitor for drowsiness side effects.     Referral placed to [facility] in [place], please call them to schedule an appointment.     Continue epley maneuvers at home.

## 2025-04-11 NOTE — PROGRESS NOTES
Assessment & Plan     1. Vertigo (Primary)  - Discussed with Alejo symptoms and exam most consistent with BPPV and with symptoms worsening, will RX Meclizine 25 mg TID PRN for dizziness, side effects reviewed, and will also refer to [facility] in [place] for further management of symptoms. Recommend continuing epley maneuvers at home. Return to clinic and ER precautions discussed.   - meclizine (ANTIVERT) 25 MG tablet; Take 1 tablet (25 mg) by mouth 3 times daily as needed for dizziness.  Dispense: 30 tablet; Refill: 0  - Physical Therapy  Referral - To a [facility] Location (Use POS/Location)  - Other Specialty Referral    Follow up as needed. Reasons to follow-up sooner or seek emergent care reviewed.     Patria Palma PA-C  [facility]       Subjective     Alejo Antunez is a 70 year old male who presents to clinic today for the following health issues:    HPI   Chief Complaint   Patient presents with    Follow Up     Patient was seen yesterday with Dr. Brown for vertigo, has now become worse and constant, wondering if he has an inner ear infection, Dr. Brown did look at these yesterday, did take dramamine to try and help but recently took this 40 minutes ago so unsure if it is helping      Alejo presents with ~8 days of dizziness. Initially his dizziness was with positional changes only, especially when he is going from sitting to standing.  He was seen yesterday for this and Dr. Brown recommended Epley maneuver. He has done this 3 times in the last 24 hours. Last night he got up twice to go to the bathroom and his dizziness was pretty severe. Has not gone away so far today. He went into [facility] and got dramamine, took that 45 minutes ago, no relief. While he was at [facility] he noticed some left ear pressure/discomfort. It is not true pain. No changes in his hearing. He does have ringing in L ear but this is chronic. No nausea, vomiting, or changes to his vision. He  states he feels like it is on a ship and it is rocking around. He does have the room spinning sensation when he moves his head. He does not have the dizziness here sitting in the chair, wonders if the dramamine is kicking any in. No fevers or chills. He feels otherwise normal. He is going on a trip to [place] next Thursday and wants to figure this out before he leaves.     Daily medications reviewed.       Objective    /72 (BP Location: Left arm, Patient Position: Sitting, Cuff Size: Adult Large)   Pulse 73   Wt 86.2 kg (190 lb)   SpO2 99%   BMI 30.21 kg/m    Body mass index is 30.21 kg/m .    Physical Examination:  GENERAL: healthy, alert and no distress  EYES: Eyes grossly normal to inspection other than abnormal left pupil which is baseline per patient, otherwise PERRL and conjunctivae and sclerae normal, horizontal nystagmus noted with EOMs, L>R  HENT: ear canals and TM's normal, nose and mouth without ulcers or lesions  NECK: no adenopathy, no asymmetry, masses, or scars and thyroid normal to palpation  RESP: lungs clear to auscultation - no rales, rhonchi or wheezes  CV: regular rate and rhythm, normal S1 S2, no S3 or S4, no murmur, click or rub, no peripheral edema   ABDOMEN: soft and non-tender  MS: no gross musculoskeletal defects noted, no edema  SKIN: no suspicious lesions or rashes  NEURO: CN I-XII grossly intact, normal upper and lower deep tendon reflexes bilaterally, 5/5 strength testing of upper/lower extremities bilaterally, normal speech  PSYCH: mentation appears normal, affect normal/bright

## 2025-04-15 ENCOUNTER — MYC REFILL (OUTPATIENT)
Dept: FAMILY MEDICINE | Facility: CLINIC | Age: 70
End: 2025-04-15

## 2025-04-15 DIAGNOSIS — R42 VERTIGO: ICD-10-CM

## 2025-04-15 RX ORDER — MECLIZINE HYDROCHLORIDE 25 MG/1
25 TABLET ORAL 3 TIMES DAILY PRN
Qty: 30 TABLET | Refills: 0 | Status: SHIPPED | OUTPATIENT
Start: 2025-04-15

## 2025-04-15 RX ORDER — MECLIZINE HYDROCHLORIDE 25 MG/1
25 TABLET ORAL 3 TIMES DAILY PRN
Qty: 30 TABLET | Refills: 0 | Status: CANCELLED | OUTPATIENT
Start: 2025-04-15

## 2025-04-27 DIAGNOSIS — R42 VERTIGO: ICD-10-CM

## 2025-04-28 RX ORDER — MECLIZINE HYDROCHLORIDE 25 MG/1
TABLET ORAL
COMMUNITY
Start: 2025-04-28

## 2025-04-28 NOTE — TELEPHONE ENCOUNTER
Alejo Antunez is requesting a refill of:    Refused Prescriptions:                       Disp   Refills    meclizine (ANTIVERT) 25 MG tablet [Pharmac*                Sig: TAKE ONE TABLET BY MOUTH THREE TIMES DAILY AS NEEDED           FOR DIZZINESS  Refused By: ARMIN MCKINNON  Reason for Refusal: Patient has requested refill too soon  Reason for Refusal Comment: Refill sent on 04/15/25

## 2025-08-07 ENCOUNTER — MYC MEDICAL ADVICE (OUTPATIENT)
Dept: FAMILY MEDICINE | Facility: CLINIC | Age: 70
End: 2025-08-07

## 2025-08-07 DIAGNOSIS — F40.243 FEAR OF FLYING: ICD-10-CM

## 2025-08-07 RX ORDER — ALPRAZOLAM 0.25 MG
0.25 TABLET ORAL
Qty: 2 TABLET | Refills: 0 | Status: SHIPPED | OUTPATIENT
Start: 2025-08-07